# Patient Record
Sex: FEMALE | Race: WHITE | NOT HISPANIC OR LATINO | URBAN - METROPOLITAN AREA
[De-identification: names, ages, dates, MRNs, and addresses within clinical notes are randomized per-mention and may not be internally consistent; named-entity substitution may affect disease eponyms.]

---

## 2019-09-10 ENCOUNTER — AMBULATORY - HEALTHEAST (OUTPATIENT)
Dept: GERIATRICS | Facility: CLINIC | Age: 74
End: 2019-09-10

## 2019-09-11 ENCOUNTER — AMBULATORY - HEALTHEAST (OUTPATIENT)
Dept: ADMINISTRATIVE | Facility: CLINIC | Age: 74
End: 2019-09-11

## 2019-09-11 ENCOUNTER — OFFICE VISIT - HEALTHEAST (OUTPATIENT)
Dept: GERIATRICS | Facility: CLINIC | Age: 74
End: 2019-09-11

## 2019-09-11 DIAGNOSIS — R41.89 COGNITIVE IMPAIRMENT: ICD-10-CM

## 2019-09-11 DIAGNOSIS — Z87.898 HISTORY OF SEIZURES: ICD-10-CM

## 2019-09-11 DIAGNOSIS — Z86.711 PERSONAL HISTORY OF PULMONARY EMBOLISM: ICD-10-CM

## 2019-09-11 DIAGNOSIS — Z90.49 STATUS POST CHOLECYSTECTOMY: ICD-10-CM

## 2019-09-11 DIAGNOSIS — D64.9 ANEMIA, UNSPECIFIED TYPE: ICD-10-CM

## 2019-09-11 RX ORDER — DOCUSATE SODIUM 100 MG/1
100 CAPSULE, LIQUID FILLED ORAL 2 TIMES DAILY PRN
Status: SHIPPED | COMMUNITY
Start: 2019-09-11

## 2019-09-11 RX ORDER — LEVETIRACETAM 500 MG/1
500 TABLET ORAL 3 TIMES DAILY
Status: SHIPPED | COMMUNITY
Start: 2019-09-11

## 2019-09-11 RX ORDER — LOSARTAN POTASSIUM 100 MG/1
50 TABLET ORAL DAILY
Status: SHIPPED | COMMUNITY
Start: 2019-09-11

## 2019-09-11 RX ORDER — ACETAMINOPHEN 500 MG
500 TABLET ORAL EVERY 4 HOURS PRN
Status: SHIPPED | COMMUNITY
Start: 2019-09-11

## 2019-09-11 RX ORDER — METOPROLOL SUCCINATE 50 MG/1
50 TABLET, EXTENDED RELEASE ORAL DAILY
Status: SHIPPED | COMMUNITY
Start: 2019-09-11

## 2019-09-11 RX ORDER — VENLAFAXINE HYDROCHLORIDE 150 MG/1
150 CAPSULE, EXTENDED RELEASE ORAL DAILY
Status: SHIPPED | COMMUNITY
Start: 2019-09-11

## 2019-09-12 ENCOUNTER — RECORDS - HEALTHEAST (OUTPATIENT)
Dept: LAB | Facility: CLINIC | Age: 74
End: 2019-09-12

## 2019-09-13 LAB
ANION GAP SERPL CALCULATED.3IONS-SCNC: 6 MMOL/L (ref 5–18)
BUN SERPL-MCNC: 8 MG/DL (ref 8–28)
CALCIUM SERPL-MCNC: 7.7 MG/DL (ref 8.5–10.5)
CHLORIDE BLD-SCNC: 103 MMOL/L (ref 98–107)
CO2 SERPL-SCNC: 25 MMOL/L (ref 22–31)
CREAT SERPL-MCNC: 0.52 MG/DL (ref 0.6–1.1)
ERYTHROCYTE [DISTWIDTH] IN BLOOD BY AUTOMATED COUNT: 16.6 % (ref 11–14.5)
GFR SERPL CREATININE-BSD FRML MDRD: >60 ML/MIN/1.73M2
GLUCOSE BLD-MCNC: 65 MG/DL (ref 70–125)
HCT VFR BLD AUTO: 29.7 % (ref 35–47)
HGB BLD-MCNC: 9.6 G/DL (ref 12–16)
MAGNESIUM SERPL-MCNC: 1.4 MG/DL (ref 1.8–2.6)
MCH RBC QN AUTO: 29 PG (ref 27–34)
MCHC RBC AUTO-ENTMCNC: 32.3 G/DL (ref 32–36)
MCV RBC AUTO: 90 FL (ref 80–100)
PLATELET # BLD AUTO: 363 THOU/UL (ref 140–440)
PMV BLD AUTO: 9.8 FL (ref 8.5–12.5)
POTASSIUM BLD-SCNC: 3.4 MMOL/L (ref 3.5–5)
PREALB SERPL-MCNC: 10.1 MG/DL (ref 19–38)
RBC # BLD AUTO: 3.31 MILL/UL (ref 3.8–5.4)
SODIUM SERPL-SCNC: 134 MMOL/L (ref 136–145)
WBC: 6.8 THOU/UL (ref 4–11)

## 2019-09-16 ENCOUNTER — OFFICE VISIT - HEALTHEAST (OUTPATIENT)
Dept: GERIATRICS | Facility: CLINIC | Age: 74
End: 2019-09-16

## 2019-09-16 DIAGNOSIS — E53.8 VITAMIN B12 DEFICIENCY: ICD-10-CM

## 2019-09-16 DIAGNOSIS — I10 ESSENTIAL HYPERTENSION: ICD-10-CM

## 2019-09-16 DIAGNOSIS — E61.2 MAGNESIUM DEFICIENCY: ICD-10-CM

## 2019-09-16 DIAGNOSIS — E87.6 HYPOKALEMIA: ICD-10-CM

## 2019-09-16 DIAGNOSIS — Z29.89 SEIZURE PROPHYLAXIS: ICD-10-CM

## 2019-09-16 DIAGNOSIS — K80.01 CALCULUS OF GALLBLADDER WITH ACUTE CHOLECYSTITIS AND OBSTRUCTION: ICD-10-CM

## 2019-09-16 DIAGNOSIS — Z79.01 ANTICOAGULATION ADEQUATE: ICD-10-CM

## 2019-09-17 ENCOUNTER — RECORDS - HEALTHEAST (OUTPATIENT)
Dept: LAB | Facility: CLINIC | Age: 74
End: 2019-09-17

## 2019-09-17 LAB
25(OH)D3 SERPL-MCNC: 24.2 NG/ML (ref 30–80)
T4 FREE SERPL-MCNC: 0.8 NG/DL (ref 0.7–1.8)
TSH SERPL DL<=0.005 MIU/L-ACNC: 8.8 UIU/ML (ref 0.3–5)
VIT B12 SERPL-MCNC: >2000 PG/ML (ref 213–816)

## 2019-09-18 ENCOUNTER — OFFICE VISIT - HEALTHEAST (OUTPATIENT)
Dept: GERIATRICS | Facility: CLINIC | Age: 74
End: 2019-09-18

## 2019-09-18 DIAGNOSIS — Z87.898 HISTORY OF SEIZURES: ICD-10-CM

## 2019-09-18 DIAGNOSIS — R41.89 COGNITIVE IMPAIRMENT: ICD-10-CM

## 2019-09-18 DIAGNOSIS — Z86.711 PERSONAL HISTORY OF PULMONARY EMBOLISM: ICD-10-CM

## 2019-09-18 DIAGNOSIS — I10 ESSENTIAL HYPERTENSION: ICD-10-CM

## 2019-09-18 DIAGNOSIS — Z90.49 STATUS POST CHOLECYSTECTOMY: ICD-10-CM

## 2019-09-20 ENCOUNTER — RECORDS - HEALTHEAST (OUTPATIENT)
Dept: LAB | Facility: CLINIC | Age: 74
End: 2019-09-20

## 2019-09-20 LAB
ANION GAP SERPL CALCULATED.3IONS-SCNC: 7 MMOL/L (ref 5–18)
BUN SERPL-MCNC: 7 MG/DL (ref 8–28)
CALCIUM SERPL-MCNC: 8.3 MG/DL (ref 8.5–10.5)
CHLORIDE BLD-SCNC: 105 MMOL/L (ref 98–107)
CO2 SERPL-SCNC: 25 MMOL/L (ref 22–31)
CREAT SERPL-MCNC: 0.61 MG/DL (ref 0.6–1.1)
GFR SERPL CREATININE-BSD FRML MDRD: >60 ML/MIN/1.73M2
GLUCOSE BLD-MCNC: 52 MG/DL (ref 70–125)
MAGNESIUM SERPL-MCNC: 1.7 MG/DL (ref 1.8–2.6)
POTASSIUM BLD-SCNC: 4 MMOL/L (ref 3.5–5)
SODIUM SERPL-SCNC: 137 MMOL/L (ref 136–145)

## 2019-09-23 ENCOUNTER — OFFICE VISIT - HEALTHEAST (OUTPATIENT)
Dept: GERIATRICS | Facility: CLINIC | Age: 74
End: 2019-09-23

## 2019-09-23 DIAGNOSIS — Z29.89 SEIZURE PROPHYLAXIS: ICD-10-CM

## 2019-09-23 DIAGNOSIS — E87.6 HYPOKALEMIA: ICD-10-CM

## 2019-09-23 DIAGNOSIS — E53.8 VITAMIN B12 DEFICIENCY: ICD-10-CM

## 2019-09-23 DIAGNOSIS — I10 ESSENTIAL HYPERTENSION: ICD-10-CM

## 2019-09-23 DIAGNOSIS — K80.01 CALCULUS OF GALLBLADDER WITH ACUTE CHOLECYSTITIS AND OBSTRUCTION: ICD-10-CM

## 2019-09-23 DIAGNOSIS — Z79.01 ANTICOAGULATION ADEQUATE: ICD-10-CM

## 2019-09-23 DIAGNOSIS — E61.2 MAGNESIUM DEFICIENCY: ICD-10-CM

## 2019-09-23 RX ORDER — MAGNESIUM OXIDE 400 MG/1
400 TABLET ORAL DAILY
Status: SHIPPED | COMMUNITY
Start: 2019-09-23

## 2019-09-25 ENCOUNTER — OFFICE VISIT - HEALTHEAST (OUTPATIENT)
Dept: GERIATRICS | Facility: CLINIC | Age: 74
End: 2019-09-25

## 2019-09-25 DIAGNOSIS — Z79.01 ANTICOAGULATION ADEQUATE: ICD-10-CM

## 2019-09-25 DIAGNOSIS — I10 ESSENTIAL HYPERTENSION: ICD-10-CM

## 2019-09-25 DIAGNOSIS — E87.6 HYPOKALEMIA: ICD-10-CM

## 2019-09-25 DIAGNOSIS — K80.01 CALCULUS OF GALLBLADDER WITH ACUTE CHOLECYSTITIS AND OBSTRUCTION: ICD-10-CM

## 2019-09-25 DIAGNOSIS — E53.8 VITAMIN B12 DEFICIENCY: ICD-10-CM

## 2019-09-25 DIAGNOSIS — Z29.89 SEIZURE PROPHYLAXIS: ICD-10-CM

## 2019-09-25 DIAGNOSIS — E61.2 MAGNESIUM DEFICIENCY: ICD-10-CM

## 2019-09-30 ENCOUNTER — AMBULATORY - HEALTHEAST (OUTPATIENT)
Dept: GERIATRICS | Facility: CLINIC | Age: 74
End: 2019-09-30

## 2020-05-07 ENCOUNTER — VIRTUAL VISIT (OUTPATIENT)
Dept: FAMILY MEDICINE | Facility: OTHER | Age: 75
End: 2020-05-07

## 2020-05-07 ENCOUNTER — NURSE TRIAGE (OUTPATIENT)
Dept: NURSING | Facility: CLINIC | Age: 75
End: 2020-05-07

## 2020-05-07 ENCOUNTER — OFFICE VISIT - HEALTHEAST (OUTPATIENT)
Dept: FAMILY MEDICINE | Facility: CLINIC | Age: 75
End: 2020-05-07

## 2020-05-07 DIAGNOSIS — Z20.822 SUSPECTED 2019 NOVEL CORONAVIRUS INFECTION: ICD-10-CM

## 2020-05-07 NOTE — TELEPHONE ENCOUNTER
Ne's  currently in [facility].   at [place] [facility] is requesting Ne be tested for Covid.  Ne asymptomatic, last exposure to  was on Sunday (5/3/2020).  In preparation for hospital discharge,  wanting Ne to be protected.  Ne not meeting criteria for reginaldo testing.  Did review process for PCR testing which is [email or web address].  Questions answered and information provided.      Reason for Disposition    COVID-19 Testing, questions about    Protocols used: CORONAVIRUS (COVID-19) EXPOSURE-A- 4.22.20

## 2020-05-08 NOTE — PROGRESS NOTES
"Date: 2020 14:17:40  Clinician: Saniya Acuna  Clinician NPI: [id number]  Patient: Lindsey Ayoub  Patient : 1945  Patient Address: [address] [place], [place] [address]  Patient Phone: [phone]  Visit Protocol: URI  Patient Summary:  Lindsey is a 75 year old ( : 1945 ) female who initiated a Visit for COVID-19 (Coronavirus) evaluation and screening. When asked the question \"Please sign me up to receive news, health information and promotions. \", Lindsey responded \"No\".    Lindsey states her symptoms started gradually 7-9 days ago. After her symptoms started, they improved and then got worse again.   Her symptoms consist of a sore throat, a cough, diarrhea, a headache, and malaise.   Symptom details     Cough: Lindsey coughs a few times an hour and her cough is not more bothersome at night. Phlegm does not come into her throat when she coughs. She does not believe her cough is caused by post-nasal drip.     Sore throat: Lindsey reports having mild throat pain (1-3 on a 10 point pain scale), does not have exudate on her tonsils, and can swallow liquids. She is not sure if the lymph nodes in her neck are enlarged. A rash has not appeared on the skin since the sore throat started.     Headache: She states the headache is mild (1-3 on a 10 point pain scale).      Lindsey denies having fever, myalgias, rhinitis, facial pain or pressure, nasal congestion, vomiting, nausea, teeth pain, ageusia, anosmia, ear pain, wheezing, and chills. She also denies taking antibiotic medication for the symptoms, having a sinus infection within the past year, and having recent facial or sinus surgery in the past 60 days. She is not experiencing dyspnea.   Precipitating events  Within the past week, Lindsey has not been exposed to someone with strep throat. She has not recently been exposed to someone with influenza. Lindsey has been in close contact with the following high risk individuals: adults 65 or older.   Pertinent COVID-19 " (Coronavirus) information  Lindsey does not work or volunteer as healthcare worker or a  and does not work or volunteer in a healthcare facility.   She does not live with a healthcare worker.   Lindsey has had a close contact with a laboratory-confirmed COVID-19 patient within 14 days of symptom onset. She also has had a close contact with a suspected COVID-19 patient within 14 days of symptom onset. Additional information about contact with COVID-19 (Coronavirus) patient as reported by the patient (free text):  tested positive on May 3 he wasn't feeling well a week before testing positive.. I have felt ill during same time period   Pertinent medical history  Lindsey does not get yeast infections when she takes antibiotics.   Lindsey does not need a return to work/school note.   Weight: 140 lbs   Lindsey does not smoke or use smokeless tobacco.   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 This illness can cause fever, cough and trouble breathing. Many people get a mild case and get better on their own. Some people can get very sick.   Will I be tested for COVID-19?  We would recommend you be tested for coronavirus. Here is how to get that scheduled:   Call [phone]. Tell them you were referred by [facility] to have a COVID-19 test. You will be scheduled at one of our [facility] testing locations (drive-up). Please have your [facility] visit information ready when you call including your visit ID number to verify you were referred.    How can I protect others in the meantime?  First, stay home and away from others (self-isolate) until:   You've  had no fever---and no medicine that reduces fever---for 3 full days (72 hours). And...    Your other symptoms have gotten better. For example, your cough or breathing has improved. And...    At least 10 days have passed since your symptoms started.   During this time:   Don't go to work, school or anywhere else.     Stay away from others in your home. No hugging, kissing or shaking hands.    Don't let anyone visit.    Cover your mouth and nose with a mask, tissue or wash cloth to avoid spreading germs.    Wash your hands and face often. Use soap and water.   How can I take care of myself?  1.Take Tylenol (acetaminophen) for fever or pain. If you have liver or kidney problems, ask your family doctor if it's okay to take Tylenol.   Adults can take either:    650 mg (two 325 mg pills) every 4 to 6 hours, or...    1,000 mg (two 500 mg pills) every 8 hours as needed.     Note: Don't take more than 3,000 mg in one day.   For children, check the Tylenol bottle for the right dose. The dose is based on the child's age or weight.  2.If you have other health problems (like cancer, heart failure, an organ transplant or severe kidney disease): Call your specialty clinic if you don't feel better in the next 2 days.  3.Know when to call 911: If your breathing is so bad that it keeps you from doing normal activities, call 911 or go to the emergency room. Tell them that you've been staying home and may have COVID-19.  4.Sign up for [employer]. We know it's scary to hear that you might have COVID-19. We want to track your symptoms to make sure you're okay over the next 2 weeks. Please look for an email from [employer]---this is a free, online program that we'll use to keep in touch. To sign up, follow the link in the email. Learn more at [email or web address].  Where can I get more information?  To learn more about COVID-19 and how to care for yourself at home, please visit the CDC website at  https://www.cdc.gov/coronavirus/2019-ncov/about/steps-when-sick.html.  For more about your care at [facility], please visit [email or web address]     Diagnosis: Cough  Diagnosis ICD: R05

## 2020-05-09 ENCOUNTER — COMMUNICATION - HEALTHEAST (OUTPATIENT)
Dept: FAMILY MEDICINE | Facility: CLINIC | Age: 75
End: 2020-05-09

## 2021-06-01 NOTE — PROGRESS NOTES
[facility]      Facility:    [facility] [[id number]]  Code Status: POLST AVAILABLE      Chief Complaint/Reason for Visit:   Chief Complaint   Patient presents with     Review Of Multiple Medical Conditions       HPI:   Ne is a 74 y.o. female who is a recent transfer from [facility] with admission on 8/28/2019 and discharged on 9/10/2019.  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  She was previously hospitalized at [facility] from 2/26/2019 to 3/1/2019 with extensive right lower extremity DVT involving IVC, right iliac femoral and distal veins.  She was then discharged on apixaban.  On 3/11/2019 underwent venogram thrombolytic and thrombectomy followed by venoplasty and IVC filter retrieval.  At that time she was diagnosed with a PE was choledocholithiasis causing bile duct dilation of 1.2cm.  GI attempted ERCP on 2/27/1990 with stent placement but was unsuccessful as the ampulla could not be cannulated.  It was then recommended that she have a laparoscopic assisted ERCP with lap amanda when safe to do so.  She did see Dr. Wick in clinic on 8/7/2019 plan was to hold on surgery as she was asymptomatic.  The night prior to admission developed post prandial right upper quadrant abdominal pain associate with fever and chills which prompted her ED presentation.  She was afebrile and had normal vitals, leukocytosis with a white count of 15 point 8T bili of 2.5D bili 1.9 ALP 1154, imaging showed cholecystitis with hypertrophic gallbladder intra-and extrahepatic biliary dilatation as well as numerous common bile duct stones.  Was started on Zosyn and surgery was consulted.  Taken to the OR on 8/11/2019 for combined ERCP and cholecystectomy.  Laparoscopic  removal was attempted though converted to open procedure due to failing of insufflation due to high intra-abdominal pressure and maximum dose of paralytics.  Gallbladder was distended though not particularly inflamed.  There was diffuse oozing from the gallbladder fossa, which was felt to be related to apixaban so a drain was placed.  Despite neuromuscular blockade reversal she was intubated overnight postoperatively and required Adan-Synephrine with extubation the following day.  Also given 1 unit packed red blood cell for hemoglobin of 6.9 and FFP vitamin K for INR reversal with an INR of 2.6 on 9/1/2019.  She did have her drain/Posada removed prior to discharge.  She was discharged to TCU for rehab.    Past Medical History:  Past Medical History:   Diagnosis Date     Acute deep vein thrombosis (DVT) of femoral vein of right lower extremity (H)      Anemia      Benign essential HTN      Bilateral pulmonary embolism (H)      BPV (benign positional vertigo), bilateral      Breast cancer (H)      Cataracts, bilateral      Choledocholithiasis with cholecystitis      Depression      Epilepsy, unspecified, intractable, without status epilepticus (H)      Estrogen receptor negative      Fx wrist      Gastric bypass status for obesity      GIST (gastrointestinal stromal tumor), non-malignant      HTN (hypertension)      Hyponatremia      Malignant neoplasm of breast (female) (H)      Obesity      Osteopenia      S/p bilateral carpal tunnel release      Seizures (H)      Systolic murmur      Unspecified disorder of ear, unspecified ear            Surgical History:  Past Surgical History:   Procedure Laterality Date     APPENDECTOMY       BREAST LUMPECTOMY  2009     CARPAL TUNNEL RELEASE Left 01/29/2015     CHOLECYSTECTOMY OPEN  08/30/2019     ERCP  02/27/2019     ERCP  08/30/2019     GASTRIC BYPASS  2003     HEMIARTHROPLASTY HIP Right 12/03/2010     HYSTERECTOMY       INNER EAR SURGERY       IR IVC FILTER PLACEMENT Right  2019     REVISION TOTAL HIP ARTHROPLASTY Right 2011     TOTAL HIP ARTHROPLASTY Left 2011       Family History:   Family History   Problem Relation Age of Onset     Diabetes Mother      Hypertension Mother      Kidney cancer Mother      No Medical Problems Father      COPD Sister      Breast cancer Maternal Grandmother      No Medical Problems Paternal Grandmother      No Medical Problems Paternal Grandfather      Cancer Sister        Social History:    Social History     Socioeconomic History     Marital status:      Spouse name: Not on file     Number of children: Not on file     Years of education: Not on file     Highest education level: Not on file   Occupational History     Not on file   Social Needs     Financial resource strain: Not on file     Food insecurity:     Worry: Not on file     Inability: Not on file     Transportation needs:     Medical: Not on file     Non-medical: Not on file   Tobacco Use     Smoking status: Former Smoker     Last attempt to quit: 1965     Years since quittin.7     Smokeless tobacco: Never Used   Substance and Sexual Activity     Alcohol use: Not Currently     Alcohol/week: 2.4 oz     Types: 4 Glasses of wine per week     Drug use: Not on file     Sexual activity: Not on file   Lifestyle     Physical activity:     Days per week: Not on file     Minutes per session: Not on file     Stress: Not on file   Relationships     Social connections:     Talks on phone: Not on file     Gets together: Not on file     Attends Yazdanism service: Not on file     Active member of club or organization: Not on file     Attends meetings of clubs or organizations: Not on file     Relationship status: Not on file     Intimate partner violence:     Fear of current or ex partner: Not on file     Emotionally abused: Not on file     Physically abused: Not on file     Forced sexual activity: Not on file   Other Topics Concern     Not on file   Social History Narrative      Not on file          Review of Systems   Constitutional: Positive for activity change and fatigue. Negative for appetite change, chills, diaphoresis and fever.        Denies concerns   HENT: Negative for congestion and hearing loss.    Eyes: Negative.    Respiratory: Negative for shortness of breath and wheezing.    Cardiovascular: Positive for leg swelling.   Gastrointestinal: Negative for abdominal distention, abdominal pain, constipation, diarrhea and nausea.   Endocrine: Negative.    Genitourinary: Negative for difficulty urinating.   Musculoskeletal:        Denies pain   Skin: Positive for wound.        Abdominal incision   Allergic/Immunologic: Negative.    Neurological: Negative for dizziness, tremors, speech difficulty and light-headedness.   Hematological: Negative.    Psychiatric/Behavioral: Negative for agitation, confusion, hallucinations and sleep disturbance. The patient is not nervous/anxious.        Vitals:    09/16/19 1134   BP: 123/87   Pulse: 80   Resp: 16   Temp: 98  F (36.7  C)   SpO2: 99%   Weight: 128 lb (58.1 kg)       Physical Exam   Constitutional: No distress.   No acute issues   HENT:   Head: Normocephalic and atraumatic.   Mouth/Throat: Oropharynx is clear and moist. No oropharyngeal exudate.   Eyes: Right eye exhibits no discharge. Left eye exhibits no discharge. No scleral icterus.   Neck: Normal range of motion. Neck supple. No JVD present.   Cardiovascular: Normal rate. Exam reveals no gallop and no friction rub.   No murmur heard.  Pulmonary/Chest: Effort normal. No stridor. No respiratory distress. She has no wheezes. She has no rales.   Dim, RA   Abdominal: Soft. Bowel sounds are normal. She exhibits no distension. There is no tenderness. There is no rebound.   Denies constipation or diarrhea   Genitourinary:   Genitourinary Comments: deferred   Musculoskeletal: She exhibits edema.   Lymphedema present, using wraps   Neurological: She is alert.   A/O x2, recall issues   Skin: Skin  is warm and dry. She is not diaphoretic. No erythema.   Abdominal incision, staples present   Psychiatric: She has a normal mood and affect.   Denies anxiety or depression   Vitals reviewed.      Medication List:  Current Outpatient Medications   Medication Sig     magnesium chloride (SLOW-MAG) 64 mg TbEC delayed-release tablet Take 64 mg by mouth 3 (three) times a day.     acetaminophen (TYLENOL) 500 MG tablet Take 500 mg by mouth every 4 (four) hours as needed for pain.     apixaban (ELIQUIS) 5 mg Tab tablet Take 5 mg by mouth 2 (two) times a day.     cyanocobalamin, vitamin B-12, 2,000 mcg Tab Take 1 tablet by mouth daily.     docusate sodium (COLACE) 100 MG capsule Take 100 mg by mouth 2 (two) times a day as needed for constipation.     levETIRAcetam (KEPPRA) 500 MG tablet Take 500 mg by mouth 3 (three) times a day.            losartan (COZAAR) 100 MG tablet Take 100 mg by mouth daily.     metoprolol succinate (TOPROL-XL) 50 MG 24 hr tablet Take 50 mg by mouth daily.            potassium chloride (KLOR-CON) 10 MEQ CR tablet Take 10 mEq by mouth 3 (three) times a day.            venlafaxine (EFFEXOR-XR) 150 MG 24 hr capsule Take 150 mg by mouth daily.       Labs:  Results for orders placed or performed in visit on 09/13/19   Basic Metabolic Panel   Result Value Ref Range    Sodium 134 (L) 136 - 145 mmol/L    Potassium 3.4 (L) 3.5 - 5.0 mmol/L    Chloride 103 98 - 107 mmol/L    CO2 25 22 - 31 mmol/L    Anion Gap, Calculation 6 5 - 18 mmol/L    Glucose 65 (L) 70 - 125 mg/dL    Calcium 7.7 (L) 8.5 - 10.5 mg/dL    BUN 8 8 - 28 mg/dL    Creatinine 0.52 (L) 0.60 - 1.10 mg/dL    GFR MDRD Af Amer >60 >60 mL/min/1.73m2    GFR MDRD Non Af Amer >60 >60 mL/min/1.73m2     Lab Results   Component Value Date    WBC 6.8 09/13/2019    HGB 9.6 (L) 09/13/2019    HCT 29.7 (L) 09/13/2019    MCV 90 09/13/2019     09/13/2019     No results found for: TSH    No results found for: [id number]    No results found for:  [id number]      Assessment/Plan:    ERCP and cholecystectomy per open procedure: continue incisional cares, fu with surgeon on 9/16/19    Poor nutrition: Prealbumin 10.1, dietitian consulted    Pain control: Continue Tylenol 500 mg every 4 hours as needed, uses periodically    DVT prophylaxis: Continue apixaban 5 mg twice daily    Vitamin B12 deficiency: Continue cyanocobalamin 2000mcg daily    Hypo-kalemia: Increased potassium to 10 mEq 3 times daily, last K 3.4, recheck in 1 week    Hypomagnesia: Increase to Slow-Mag 64 mg 3 times daily, last 1.4, recheck in 1 week    Seizure prophylaxis: Continue Keppra 500 mg 3 times daily    Hypertension: Continue losartan 100 mg daily and decrease metoprolol tartrate to 50 mg daily, monitor blood pressure twice daily x1 week    Constipation: Continue docusate 100 mg twice daily as needed    Depression: Continue venlafaxine 100 mg daily    Disposition: Plans to return to previous residence.  Pending cognitive assessment    The care plan has been reviewed and all orders signed. Changes to care plan, if any, as noted. Otherwise, continue care plan of care.  The total time spent with this patient was 35 minutes, with greater than 50% spent in counseling and coordination of care that included multiple issues per surgeon follow-up, pain control, therapy, blood pressure monitoring and electrolyte replacement which lasted 18 minutes.    Post Discharge Medication Reconciliation Status: discharge medications reconciled, continue medications without change      Electronically signed by: Octavio Vanegas NP

## 2021-06-01 NOTE — PROGRESS NOTES
[facility]      Facility:    [facility] [[id number]]  Code Status: POLST AVAILABLE      Chief Complaint/Reason for Visit:   Chief Complaint   Patient presents with     Review Of Multiple Medical Conditions       HPI:   Ne is a 74 y.o. female who is a recent transfer from [facility] with admission on 8/28/2019 and discharged on 9/10/2019.  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  She was previously hospitalized at [facility] from 2/26/2019 to 3/1/2019 with extensive right lower extremity DVT involving IVC, right iliac femoral and distal veins.  She was then discharged on apixaban.  On 3/11/2019 underwent venogram thrombolytic and thrombectomy followed by venoplasty and IVC filter retrieval.  At that time she was diagnosed with a PE was choledocholithiasis causing bile duct dilation of 1.2cm.  GI attempted ERCP on 2/27/1990 with stent placement but was unsuccessful as the ampulla could not be cannulated.  It was then recommended that she have a laparoscopic assisted ERCP with lap amanda when safe to do so.  She did see Dr. Wick in clinic on 8/7/2019 plan was to hold on surgery as she was asymptomatic.  The night prior to admission developed post prandial right upper quadrant abdominal pain associate with fever and chills which prompted her ED presentation.  She was afebrile and had normal vitals, leukocytosis with a white count of 15 point 8T bili of 2.5D bili 1.9 ALP 1154, imaging showed cholecystitis with hypertrophic gallbladder intra-and extrahepatic biliary dilatation as well as numerous common bile duct stones.  Was started on Zosyn and surgery was consulted.  Taken to the OR on 8/11/2019 for combined ERCP and cholecystectomy.  Laparoscopic  removal was attempted though converted to open procedure due to failing of insufflation due to high intra-abdominal pressure and maximum dose of paralytics.  Gallbladder was distended though not particularly inflamed.  There was diffuse oozing from the gallbladder fossa, which was felt to be related to apixaban so a drain was placed.  Despite neuromuscular blockade reversal she was intubated overnight postoperatively and required Adan-Synephrine with extubation the following day.  Also given 1 unit packed red blood cell for hemoglobin of 6.9 and FFP vitamin K for INR reversal with an INR of 2.6 on 9/1/2019.  She did have her drain/Posada removed prior to discharge.  She was discharged to TCU for rehab.    Past Medical History:  Past Medical History:   Diagnosis Date     Acute deep vein thrombosis (DVT) of femoral vein of right lower extremity (H)      Anemia      Benign essential HTN      Bilateral pulmonary embolism (H)      BPV (benign positional vertigo), bilateral      Breast cancer (H)      Cataracts, bilateral      Choledocholithiasis with cholecystitis      Depression      Epilepsy, unspecified, intractable, without status epilepticus (H)      Estrogen receptor negative      Fx wrist      Gastric bypass status for obesity      GIST (gastrointestinal stromal tumor), non-malignant      HTN (hypertension)      Hyponatremia      Malignant neoplasm of breast (female) (H)      Obesity      Osteopenia      S/p bilateral carpal tunnel release      Seizures (H)      Systolic murmur      Unspecified disorder of ear, unspecified ear            Surgical History:  Past Surgical History:   Procedure Laterality Date     APPENDECTOMY       BREAST LUMPECTOMY  2009     CARPAL TUNNEL RELEASE Left 01/29/2015     CHOLECYSTECTOMY OPEN  08/30/2019     ERCP  02/27/2019     ERCP  08/30/2019     GASTRIC BYPASS  2003     HEMIARTHROPLASTY HIP Right 12/03/2010     HYSTERECTOMY       INNER EAR SURGERY       IR IVC FILTER PLACEMENT Right  2019     REVISION TOTAL HIP ARTHROPLASTY Right 2011     TOTAL HIP ARTHROPLASTY Left 2011       Family History:   Family History   Problem Relation Age of Onset     Diabetes Mother      Hypertension Mother      Kidney cancer Mother      No Medical Problems Father      COPD Sister      Breast cancer Maternal Grandmother      No Medical Problems Paternal Grandmother      No Medical Problems Paternal Grandfather      Cancer Sister        Social History:    Social History     Socioeconomic History     Marital status:      Spouse name: Not on file     Number of children: Not on file     Years of education: Not on file     Highest education level: Not on file   Occupational History     Not on file   Social Needs     Financial resource strain: Not on file     Food insecurity:     Worry: Not on file     Inability: Not on file     Transportation needs:     Medical: Not on file     Non-medical: Not on file   Tobacco Use     Smoking status: Former Smoker     Last attempt to quit: 1965     Years since quittin.7     Smokeless tobacco: Never Used   Substance and Sexual Activity     Alcohol use: Not Currently     Alcohol/week: 4.0 standard drinks     Types: 4 Glasses of wine per week     Drug use: Not on file     Sexual activity: Not on file   Lifestyle     Physical activity:     Days per week: Not on file     Minutes per session: Not on file     Stress: Not on file   Relationships     Social connections:     Talks on phone: Not on file     Gets together: Not on file     Attends Oriental orthodox service: Not on file     Active member of club or organization: Not on file     Attends meetings of clubs or organizations: Not on file     Relationship status: Not on file     Intimate partner violence:     Fear of current or ex partner: Not on file     Emotionally abused: Not on file     Physically abused: Not on file     Forced sexual activity: Not on file   Other Topics Concern     Not on file   Social History  Narrative     Not on file          Review of Systems   Constitutional: Positive for activity change and fatigue. Negative for appetite change, chills, diaphoresis and fever.        Denies concerns   HENT: Negative for congestion and hearing loss.    Eyes: Negative.    Respiratory: Negative for shortness of breath and wheezing.    Cardiovascular: Positive for leg swelling.        Lymphedema   Gastrointestinal: Negative for abdominal distention, abdominal pain, constipation, diarrhea and nausea.   Endocrine: Negative.    Genitourinary: Negative for difficulty urinating.   Musculoskeletal:        Denies pain   Skin: Positive for wound.        Abdominal incision   Allergic/Immunologic: Negative.    Neurological: Negative for dizziness, tremors, speech difficulty and light-headedness.   Hematological: Negative.    Psychiatric/Behavioral: Negative for agitation, confusion, hallucinations and sleep disturbance. The patient is not nervous/anxious.        Vitals:    09/23/19 0759   BP: (!) 140/92   Pulse: 94   Resp: 16   Temp: 97  F (36.1  C)   SpO2: 93%   Weight: 128 lb (58.1 kg)       Physical Exam   Constitutional: No distress.   No acute issues   HENT:   Head: Normocephalic and atraumatic.   Mouth/Throat: Oropharynx is clear and moist. No oropharyngeal exudate.   Eyes: Right eye exhibits no discharge. Left eye exhibits no discharge. No scleral icterus.   Neck: Normal range of motion. Neck supple. No JVD present.   Cardiovascular: Normal rate. Exam reveals no gallop and no friction rub.   No murmur heard.  Pulmonary/Chest: Effort normal. No stridor. No respiratory distress. She has no wheezes. She has no rales.   Dim, RA   Abdominal: Soft. Bowel sounds are normal. She exhibits no distension. There is no tenderness. There is no rebound.   Denies constipation or diarrhea, small fluid pocket in LUQ, stable   Genitourinary:    Genitourinary Comments: deferred   Musculoskeletal:         General: Edema present.      Comments:  Lymphedema present, using wraps on RLE     Neurological: She is alert.   A/O x2, recall issues   Skin: Skin is warm and dry. She is not diaphoretic. No erythema.   Abdominal incision, staples and sutures removed, steri strips intact   Psychiatric: She has a normal mood and affect.   Denies anxiety or depression   Vitals reviewed.      Medication List:  Current Outpatient Medications   Medication Sig     cholecalciferol, vitamin D3, 1,000 unit (25 mcg) tablet Take 2,000 Units by mouth daily.     magnesium oxide (MAG-OX) 400 mg (241.3 mg magnesium) tablet Take 400 mg by mouth daily.     acetaminophen (TYLENOL) 500 MG tablet Take 500 mg by mouth every 4 (four) hours as needed for pain.     apixaban (ELIQUIS) 5 mg Tab tablet Take 5 mg by mouth 2 (two) times a day.     cyanocobalamin, vitamin B-12, 2,000 mcg Tab Take 1 tablet by mouth daily.     docusate sodium (COLACE) 100 MG capsule Take 100 mg by mouth 2 (two) times a day as needed for constipation.     levETIRAcetam (KEPPRA) 500 MG tablet Take 500 mg by mouth 3 (three) times a day.            losartan (COZAAR) 100 MG tablet Take 50 mg by mouth daily. Hold for SBP <110 or DBP <60           magnesium chloride (SLOW-MAG) 64 mg TbEC delayed-release tablet Take 64 mg by mouth 3 (three) times a day.     metoprolol succinate (TOPROL-XL) 50 MG 24 hr tablet Take 50 mg by mouth daily.            potassium chloride (KLOR-CON) 10 MEQ CR tablet Take 10 mEq by mouth 3 (three) times a day.            venlafaxine (EFFEXOR-XR) 150 MG 24 hr capsule Take 150 mg by mouth daily.       Labs:  Results for orders placed or performed in visit on 09/20/19   Basic Metabolic Panel   Result Value Ref Range    Sodium 137 136 - 145 mmol/L    Potassium 4.0 3.5 - 5.0 mmol/L    Chloride 105 98 - 107 mmol/L    CO2 25 22 - 31 mmol/L    Anion Gap, Calculation 7 5 - 18 mmol/L    Glucose 52 (LL) 70 - 125 mg/dL    Calcium 8.3 (L) 8.5 - 10.5 mg/dL    BUN 7 (L) 8 - 28 mg/dL    Creatinine 0.61 0.60 - 1.10  mg/dL    GFR MDRD Af Amer >60 >60 mL/min/1.73m2    GFR MDRD Non Af Amer >60 >60 mL/min/1.73m2     Lab Results   Component Value Date    WBC 6.8 09/13/2019    HGB 9.6 (L) 09/13/2019    HCT 29.7 (L) 09/13/2019    MCV 90 09/13/2019     09/13/2019     Lab Results   Component Value Date    TSH 8.80 (H) 09/17/2019       Vitamin D, Total (25-Hydroxy)   Date Value Ref Range Status   09/17/2019 24.2 (L) 30.0 - 80.0 ng/mL Final       Lab Results   Component Value Date    ENQVYHQV89 >2,000 (H) 09/17/2019         Assessment/Plan:    ERCP and cholecystectomy per open procedure: continue incisional cares, staples and suture removed, Steri-Strips intact, ordered to keep intact until 9/30, no f/u ordered    Poor nutrition: Prealbumin 10.1, PO intake improved    Pain control: Continue Tylenol 500 mg every 4 hours as needed, uses periodically    DVT prophylaxis: Continue apixaban 5 mg twice daily    Vitamin B12 deficiency: cyanocobalamin discontinued    Vit D def: started on D3 2000U daily    Hypo-kalemia: Increased potassium to 10 mEq 3 times daily, last K 4.0 on 9/20/19    Hypomagnesia: Increased to Slow-Mag 64 mg 3 times daily with mag oxide 400mg daily, last 1.7 on 9/20/19. Recheck with PCP    Seizure prophylaxis: Continue Keppra 500 mg 3 times daily    Subclinical hypothyroidism: Last TSH 8.8 with free T4 0.8    Hypertension: decreased losartan 50 mg daily (hold for SBP <110 and DBP <60) and decreased metoprolol tartrate to 50 mg daily, SBP <140    Constipation: Continue docusate 100 mg twice daily as needed    Depression: Continue venlafaxine 100 mg daily    Disposition: Plans to return to previous residence.  [facility] 19/30 and CPT 4.6      Electronically signed by: Octavio Vanegas NP

## 2021-06-03 VITALS
WEIGHT: 128 LBS | HEART RATE: 94 BPM | TEMPERATURE: 97 F | RESPIRATION RATE: 16 BRPM | OXYGEN SATURATION: 93 % | SYSTOLIC BLOOD PRESSURE: 140 MMHG | DIASTOLIC BLOOD PRESSURE: 92 MMHG

## 2021-06-03 VITALS
WEIGHT: 128 LBS | HEART RATE: 80 BPM | TEMPERATURE: 98 F | DIASTOLIC BLOOD PRESSURE: 87 MMHG | SYSTOLIC BLOOD PRESSURE: 123 MMHG | OXYGEN SATURATION: 99 % | RESPIRATION RATE: 16 BRPM

## 2021-06-03 VITALS
DIASTOLIC BLOOD PRESSURE: 86 MMHG | WEIGHT: 128 LBS | TEMPERATURE: 98 F | OXYGEN SATURATION: 97 % | SYSTOLIC BLOOD PRESSURE: 125 MMHG | HEART RATE: 97 BPM | RESPIRATION RATE: 16 BRPM

## 2021-06-08 NOTE — TELEPHONE ENCOUNTER
Coronavirus (COVID-19) Notification    Patient  Ne Monzon    Reason for call  Notify of Positive Coronavirus (COVID-19) lab results, assess symptoms,  review [facility] recommendations    Lab Result    Lab test:  2019-nCoV rRt-PCR or SARS-CoV-2 PCR    Oropharyngeal AND/OR nasopharyngeal swabs is POSITIVE for 2019-nCoV RNA/SARS-COV-2 PCR (COVID-19 virus)    RN Recommendations/Instructions per [facility] Coronavirus COVID-19 recommendations    Brief introduction script  Hi, My name is Sissy and I am calling on behalf of [email or web address] [place] [employer].  We were notified that your Coronavirus test (COVID-19) for was POSITIVE for the virus.  I have some information to relay to you but first I wanted to mention that the [place] Dept of Health will be contacting you shortly [it's possible MD already called Patient] to talk to you more about how you are feeling and other people you have had contact with who might now also have the virus.  Also, [facility] is Partnering with the [facility] for Covid-19 research, you may be contacted directly by research staff.    Assessment (Inquire about Patient's current symptoms)  No symptoms.  Spouse was hospitalized for covid-19.    If at time of call, Patients symptoms hare worsened, the Patient should contact 911 or have someone drive them to Emergency Dept promptly:      If Patient calling 911, inform 911 personal that you have tested positive for the Coronavirus (COVID-19).  Place mask on and await 911 to arrive.    If Emergency Dept, If possible, please have another adult drive you to the Emergency Dept but you need to wear mask when in contact with other people.      Review information with Patient    Since you tested POSITIVE for the COVID-19 virus, it is important that you protect others from being exposed and infected with this virus.    [For safety, it's very important to follow these rules.]    First, stay home and away from others (self-isolate) until:    You've  had no fever--and no medicine that reduces fever--for 3 full days (72 hours). And      Your other symptoms have gotten better. For example, your cough or breathing has improved. And     At least 10 days have passed since your symptoms started.    During this time:    Stay in your own room (and use your own bathroom), if you can.    Stay away from others in your home. No hugging, kissing or shaking hands.    Don't let anyone visit.    Don't go to work, school or anywhere else.     Clean  high touch  surfaces often (doorknobs, counters, handles, etc.). Use a household cleaning spray or wipes.    Cover your mouth and nose with a mask, tissue or washcloth to avoid spreading germs.    Wash your hands and face often with soap and water.    You should not go back to work until you meet the guidelines above for ending your home isolation. You should meet these along with any other guidelines that your employer has.  Employers: This document serves as formal notice of your employee's medical guidelines for going back to work. They must meet the above guidelines before going back to work in person.    How can I take care of myself?  1. Get lots of rest. Drink extra fluids (unless a doctor has told you not to).    2. Take Tylenol (acetaminophen) for fever or pain. If you have liver or kidney problems, ask your family doctor if it's okay to take Tylenol.     Take either:     650 mg (two 325 mg pills) every 4 to 6 hours, or     1,000 mg (two 500 mg pills) every 8 hours as needed.     Note: Don't take more than 3,000 mg in one day. Acetaminophen is found in many medicines (both prescribed and over-the-counter medicines). Read all labels to be sure you don't take too much.  For children, check the Tylenol bottle for the right dose. The dose is based on the child's age or weight.  3. If you have other health problems (like cancer, heart failure, an organ transplant or severe kidney disease): Call your specialty clinic if you don't  feel better in the next 2 days.    4. Know when to call 911: If your breathing is so bad that it keeps you from doing normal activities, call 911 or go to the emergency room. Tell them that you've been staying home and may have COVID-19.  5. Sign up for [employer]. We know it's scary to hear that you have COVID-19. We want to track your symptoms to make sure you're okay over the next 2 weeks. Please look for an email from [employer]--this is a free, online program that we'll use to keep in touch. To sign up, follow the link in the email. Learn more at [email or web address].    Where can I get more information?    To learn the [place]'s guidelines for staying home, please visit the [facility] of Health website at [email or web address]/coronavirus/basics.html.    To learn more about COVID-19 and how to care for yourself at home, please visit the CDC website at https://www.cdc.gov/coronavirus/2019-ncov/about/steps-when-sick.html.    For more options for care at [facility], please visit our website at [email or web address]      [place] Dept of Health ([facility]) COVID-19 Hotline:   [phone]    Positive COVID-19 letter Sent (Yes/No):  Yes      Sissy Mayer RN  Covid-19 Results Team  [phone]

## 2021-06-16 PROBLEM — Z29.89 SEIZURE PROPHYLAXIS: Status: ACTIVE | Noted: 2019-09-16

## 2021-06-16 PROBLEM — E87.6 HYPOKALEMIA: Status: ACTIVE | Noted: 2019-09-16

## 2021-06-16 PROBLEM — K80.01 CALCULUS OF GALLBLADDER WITH ACUTE CHOLECYSTITIS AND OBSTRUCTION: Status: ACTIVE | Noted: 2019-09-16

## 2021-06-16 PROBLEM — Z79.01 ANTICOAGULATION ADEQUATE: Status: ACTIVE | Noted: 2019-09-16

## 2021-06-16 PROBLEM — I10 ESSENTIAL HYPERTENSION: Status: ACTIVE | Noted: 2019-09-16

## 2021-06-16 PROBLEM — E53.8 VITAMIN B12 DEFICIENCY: Status: ACTIVE | Noted: 2019-09-16

## 2021-06-16 PROBLEM — E61.2 MAGNESIUM DEFICIENCY: Status: ACTIVE | Noted: 2019-09-16

## 2021-06-19 NOTE — LETTER
Letter by Octavio Vanegas NP at      Author: Octavio Vanegas NP Service: -- Author Type: --    Filed:  Encounter Date: 9/23/2019 Status: Signed         Patient: Ne Monzon   MR Number: [id number]   YOB: 1945   Date of Visit: 9/23/2019     [facility]      Facility:    [facility] [[id number]]  Code Status: POLST AVAILABLE      Chief Complaint/Reason for Visit:   Chief Complaint   Patient presents with   ? Review Of Multiple Medical Conditions       HPI:   Ne is a 74 y.o. female who is a recent transfer from [facility] with admission on 8/28/2019 and discharged on 9/10/2019.  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  She was previously hospitalized at [facility] from 2/26/2019 to 3/1/2019 with extensive right lower extremity DVT involving IVC, right iliac femoral and distal veins.  She was then discharged on apixaban.  On 3/11/2019 underwent venogram thrombolytic and thrombectomy followed by venoplasty and IVC filter retrieval.  At that time she was diagnosed with a PE was choledocholithiasis causing bile duct dilation of 1.2cm.  GI attempted ERCP on 2/27/1990 with stent placement but was unsuccessful as the ampulla could not be cannulated.  It was then recommended that she have a laparoscopic assisted ERCP with lap amanda when safe to do so.  She did see Dr. Wick in clinic on 8/7/2019 plan was to hold on surgery as she was asymptomatic.  The night prior to admission developed post prandial right upper quadrant abdominal pain associate with fever and chills which prompted her ED presentation.  She was afebrile and had normal vitals, leukocytosis with a white count of 15 point 8T bili of 2.5D bili 1.9 ALP 1154, imaging showed  cholecystitis with hypertrophic gallbladder intra-and extrahepatic biliary dilatation as well as numerous common bile duct stones.  Was started on Zosyn and surgery was consulted.  Taken to the OR on 8/11/2019 for combined ERCP and cholecystectomy.  Laparoscopic removal was attempted though converted to open procedure due to failing of insufflation due to high intra-abdominal pressure and maximum dose of paralytics.  Gallbladder was distended though not particularly inflamed.  There was diffuse oozing from the gallbladder fossa, which was felt to be related to apixaban so a drain was placed.  Despite neuromuscular blockade reversal she was intubated overnight postoperatively and required Adan-Synephrine with extubation the following day.  Also given 1 unit packed red blood cell for hemoglobin of 6.9 and FFP vitamin K for INR reversal with an INR of 2.6 on 9/1/2019.  She did have her drain/Posada removed prior to discharge.  She was discharged to TCU for rehab.    Past Medical History:  Past Medical History:   Diagnosis Date   ? Acute deep vein thrombosis (DVT) of femoral vein of right lower extremity (H)    ? Anemia    ? Benign essential HTN    ? Bilateral pulmonary embolism (H)    ? BPV (benign positional vertigo), bilateral    ? Breast cancer (H)    ? Cataracts, bilateral    ? Choledocholithiasis with cholecystitis    ? Depression    ? Epilepsy, unspecified, intractable, without status epilepticus (H)    ? Estrogen receptor negative    ? Fx wrist    ? Gastric bypass status for obesity    ? GIST (gastrointestinal stromal tumor), non-malignant    ? HTN (hypertension)    ? Hyponatremia    ? Malignant neoplasm of breast (female) (H)    ? Obesity    ? Osteopenia    ? S/p bilateral carpal tunnel release    ? Seizures (H)    ? Systolic murmur    ? Unspecified disorder of ear, unspecified ear            Surgical History:  Past Surgical History:   Procedure Laterality Date   ? APPENDECTOMY     ? BREAST LUMPECTOMY  2009   ?  CARPAL TUNNEL RELEASE Left 2015   ? CHOLECYSTECTOMY OPEN  2019   ? ERCP  2019   ? ERCP  2019   ? GASTRIC BYPASS  2003   ? HEMIARTHROPLASTY HIP Right 2010   ? HYSTERECTOMY     ? INNER EAR SURGERY     ? IR IVC FILTER PLACEMENT Right 2019   ? REVISION TOTAL HIP ARTHROPLASTY Right 2011   ? TOTAL HIP ARTHROPLASTY Left 2011       Family History:   Family History   Problem Relation Age of Onset   ? Diabetes Mother    ? Hypertension Mother    ? Kidney cancer Mother    ? No Medical Problems Father    ? COPD Sister    ? Breast cancer Maternal Grandmother    ? No Medical Problems Paternal Grandmother    ? No Medical Problems Paternal Grandfather    ? Cancer Sister        Social History:    Social History     Socioeconomic History   ? Marital status:      Spouse name: Not on file   ? Number of children: Not on file   ? Years of education: Not on file   ? Highest education level: Not on file   Occupational History   ? Not on file   Social Needs   ? Financial resource strain: Not on file   ? Food insecurity:     Worry: Not on file     Inability: Not on file   ? Transportation needs:     Medical: Not on file     Non-medical: Not on file   Tobacco Use   ? Smoking status: Former Smoker     Last attempt to quit: 1965     Years since quittin.7   ? Smokeless tobacco: Never Used   Substance and Sexual Activity   ? Alcohol use: Not Currently     Alcohol/week: 4.0 standard drinks     Types: 4 Glasses of wine per week   ? Drug use: Not on file   ? Sexual activity: Not on file   Lifestyle   ? Physical activity:     Days per week: Not on file     Minutes per session: Not on file   ? Stress: Not on file   Relationships   ? Social connections:     Talks on phone: Not on file     Gets together: Not on file     Attends [employer] service: Not on file     Active member of club or organization: Not on file     Attends meetings of clubs or organizations: Not on file     Relationship status:  Not on file   ? Intimate partner violence:     Fear of current or ex partner: Not on file     Emotionally abused: Not on file     Physically abused: Not on file     Forced sexual activity: Not on file   Other Topics Concern   ? Not on file   Social History Narrative   ? Not on file          Review of Systems   Constitutional: Positive for activity change and fatigue. Negative for appetite change, chills, diaphoresis and fever.        Denies concerns   HENT: Negative for congestion and hearing loss.    Eyes: Negative.    Respiratory: Negative for shortness of breath and wheezing.    Cardiovascular: Positive for leg swelling.        Lymphedema   Gastrointestinal: Negative for abdominal distention, abdominal pain, constipation, diarrhea and nausea.   Endocrine: Negative.    Genitourinary: Negative for difficulty urinating.   Musculoskeletal:        Denies pain   Skin: Positive for wound.        Abdominal incision   Allergic/Immunologic: Negative.    Neurological: Negative for dizziness, tremors, speech difficulty and light-headedness.   Hematological: Negative.    Psychiatric/Behavioral: Negative for agitation, confusion, hallucinations and sleep disturbance. The patient is not nervous/anxious.        Vitals:    09/23/19 0759   BP: (!) 140/92   Pulse: 94   Resp: 16   Temp: 97  F (36.1  C)   SpO2: 93%   Weight: 128 lb (58.1 kg)       Physical Exam   Constitutional: No distress.   No acute issues   HENT:   Head: Normocephalic and atraumatic.   Mouth/Throat: Oropharynx is clear and moist. No oropharyngeal exudate.   Eyes: Right eye exhibits no discharge. Left eye exhibits no discharge. No scleral icterus.   Neck: Normal range of motion. Neck supple. No JVD present.   Cardiovascular: Normal rate. Exam reveals no gallop and no friction rub.   No murmur heard.  Pulmonary/Chest: Effort normal. No stridor. No respiratory distress. She has no wheezes. She has no rales.   Dim, RA   Abdominal: Soft. Bowel sounds are normal. She  exhibits no distension. There is no tenderness. There is no rebound.   Denies constipation or diarrhea, small fluid pocket in LUQ, stable   Genitourinary:    Genitourinary Comments: deferred   Musculoskeletal:         General: Edema present.      Comments: Lymphedema present, using wraps on RLE     Neurological: She is alert.   A/O x2, recall issues   Skin: Skin is warm and dry. She is not diaphoretic. No erythema.   Abdominal incision, staples and sutures removed, steri strips intact   Psychiatric: She has a normal mood and affect.   Denies anxiety or depression   Vitals reviewed.      Medication List:  Current Outpatient Medications   Medication Sig   ? cholecalciferol, vitamin D3, 1,000 unit (25 mcg) tablet Take 2,000 Units by mouth daily.   ? magnesium oxide (MAG-OX) 400 mg (241.3 mg magnesium) tablet Take 400 mg by mouth daily.   ? acetaminophen (TYLENOL) 500 MG tablet Take 500 mg by mouth every 4 (four) hours as needed for pain.   ? apixaban (ELIQUIS) 5 mg Tab tablet Take 5 mg by mouth 2 (two) times a day.   ? cyanocobalamin, vitamin B-12, 2,000 mcg Tab Take 1 tablet by mouth daily.   ? docusate sodium (COLACE) 100 MG capsule Take 100 mg by mouth 2 (two) times a day as needed for constipation.   ? levETIRAcetam (KEPPRA) 500 MG tablet Take 500 mg by mouth 3 (three) times a day.          ? losartan (COZAAR) 100 MG tablet Take 50 mg by mouth daily. Hold for SBP <110 or DBP <60         ? magnesium chloride (SLOW-MAG) 64 mg TbEC delayed-release tablet Take 64 mg by mouth 3 (three) times a day.   ? metoprolol succinate (TOPROL-XL) 50 MG 24 hr tablet Take 50 mg by mouth daily.          ? potassium chloride (KLOR-CON) 10 MEQ CR tablet Take 10 mEq by mouth 3 (three) times a day.          ? venlafaxine (EFFEXOR-XR) 150 MG 24 hr capsule Take 150 mg by mouth daily.       Labs:  Results for orders placed or performed in visit on 09/20/19   Basic Metabolic Panel   Result Value Ref Range    Sodium 137 136 - 145 mmol/L     Potassium 4.0 3.5 - 5.0 mmol/L    Chloride 105 98 - 107 mmol/L    CO2 25 22 - 31 mmol/L    Anion Gap, Calculation 7 5 - 18 mmol/L    Glucose 52 (LL) 70 - 125 mg/dL    Calcium 8.3 (L) 8.5 - 10.5 mg/dL    BUN 7 (L) 8 - 28 mg/dL    Creatinine 0.61 0.60 - 1.10 mg/dL    GFR MDRD Af Amer >60 >60 mL/min/1.73m2    GFR MDRD Non Af Amer >60 >60 mL/min/1.73m2     Lab Results   Component Value Date    WBC 6.8 09/13/2019    HGB 9.6 (L) 09/13/2019    HCT 29.7 (L) 09/13/2019    MCV 90 09/13/2019     09/13/2019     Lab Results   Component Value Date    TSH 8.80 (H) 09/17/2019       Vitamin D, Total (25-Hydroxy)   Date Value Ref Range Status   09/17/2019 24.2 (L) 30.0 - 80.0 ng/mL Final       Lab Results   Component Value Date    [id number] >2,000 (H) 09/17/2019         Assessment/Plan:    ERCP and cholecystectomy per open procedure: continue incisional cares, staples and suture removed, Steri-Strips intact, ordered to keep intact until 9/30, no f/u ordered    Poor nutrition: Prealbumin 10.1, PO intake improved    Pain control: Continue Tylenol 500 mg every 4 hours as needed, uses periodically    DVT prophylaxis: Continue apixaban 5 mg twice daily    Vitamin B12 deficiency: cyanocobalamin discontinued    Vit D def: started on D3 2000U daily    Hypo-kalemia: Increased potassium to 10 mEq 3 times daily, last K 4.0 on 9/20/19    Hypomagnesia: Increased to Slow-Mag 64 mg 3 times daily with mag oxide 400mg daily, last 1.7 on 9/20/19. Recheck with PCP    Seizure prophylaxis: Continue Keppra 500 mg 3 times daily    Subclinical hypothyroidism: Last TSH 8.8 with free T4 0.8    Hypertension: decreased losartan 50 mg daily (hold for SBP <110 and DBP <60) and decreased metoprolol tartrate to 50 mg daily, SBP <140    Constipation: Continue docusate 100 mg twice daily as needed    Depression: Continue venlafaxine 100 mg daily    Disposition: Plans to return to previous residence.  SLUMS 19/30 and CPT 4.6      Electronically signed by:  Octavio Vanegas, NP

## 2021-06-19 NOTE — LETTER
Letter by Octavio Vanegas NP at      Author: Octavio Vanegas NP Service: -- Author Type: --    Filed:  Encounter Date: 9/16/2019 Status: (Other)         Patient: Ne Monzon   MR Number: [id number]   YOB: 1945   Date of Visit: 9/16/2019     [facility]      Facility:    [facility] [[id number]]  Code Status: POLST AVAILABLE      Chief Complaint/Reason for Visit:   Chief Complaint   Patient presents with   ? Review Of Multiple Medical Conditions       HPI:   Ne is a 74 y.o. female who is a recent transfer from [facility] with admission on 8/28/2019 and discharged on 9/10/2019.  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  She was previously hospitalized at [facility] from 2/26/2019 to 3/1/2019 with extensive right lower extremity DVT involving IVC, right iliac femoral and distal veins.  She was then discharged on apixaban.  On 3/11/2019 underwent venogram thrombolytic and thrombectomy followed by venoplasty and IVC filter retrieval.  At that time she was diagnosed with a PE was choledocholithiasis causing bile duct dilation of 1.2cm.  GI attempted ERCP on 2/27/1990 with stent placement but was unsuccessful as the ampulla could not be cannulated.  It was then recommended that she have a laparoscopic assisted ERCP with lap amanda when safe to do so.  She did see Dr. Wick in clinic on 8/7/2019 plan was to hold on surgery as she was asymptomatic.  The night prior to admission developed post prandial right upper quadrant abdominal pain associate with fever and chills which prompted her ED presentation.  She was afebrile and had normal vitals, leukocytosis with a white count of 15 point 8T bili of 2.5D bili 1.9 ALP 1154, imaging showed  cholecystitis with hypertrophic gallbladder intra-and extrahepatic biliary dilatation as well as numerous common bile duct stones.  Was started on Zosyn and surgery was consulted.  Taken to the OR on 8/11/2019 for combined ERCP and cholecystectomy.  Laparoscopic removal was attempted though converted to open procedure due to failing of insufflation due to high intra-abdominal pressure and maximum dose of paralytics.  Gallbladder was distended though not particularly inflamed.  There was diffuse oozing from the gallbladder fossa, which was felt to be related to apixaban so a drain was placed.  Despite neuromuscular blockade reversal she was intubated overnight postoperatively and required Adan-Synephrine with extubation the following day.  Also given 1 unit packed red blood cell for hemoglobin of 6.9 and FFP vitamin K for INR reversal with an INR of 2.6 on 9/1/2019.  She did have her drain/Posada removed prior to discharge.  She was discharged to TCU for rehab.    Past Medical History:  Past Medical History:   Diagnosis Date   ? Acute deep vein thrombosis (DVT) of femoral vein of right lower extremity (H)    ? Anemia    ? Benign essential HTN    ? Bilateral pulmonary embolism (H)    ? BPV (benign positional vertigo), bilateral    ? Breast cancer (H)    ? Cataracts, bilateral    ? Choledocholithiasis with cholecystitis    ? Depression    ? Epilepsy, unspecified, intractable, without status epilepticus (H)    ? Estrogen receptor negative    ? Fx wrist    ? Gastric bypass status for obesity    ? GIST (gastrointestinal stromal tumor), non-malignant    ? HTN (hypertension)    ? Hyponatremia    ? Malignant neoplasm of breast (female) (H)    ? Obesity    ? Osteopenia    ? S/p bilateral carpal tunnel release    ? Seizures (H)    ? Systolic murmur    ? Unspecified disorder of ear, unspecified ear            Surgical History:  Past Surgical History:   Procedure Laterality Date   ? APPENDECTOMY     ? BREAST LUMPECTOMY  2009   ?  CARPAL TUNNEL RELEASE Left 2015   ? CHOLECYSTECTOMY OPEN  2019   ? ERCP  2019   ? ERCP  2019   ? GASTRIC BYPASS  2003   ? HEMIARTHROPLASTY HIP Right 2010   ? HYSTERECTOMY     ? INNER EAR SURGERY     ? IR IVC FILTER PLACEMENT Right 2019   ? REVISION TOTAL HIP ARTHROPLASTY Right 2011   ? TOTAL HIP ARTHROPLASTY Left 2011       Family History:   Family History   Problem Relation Age of Onset   ? Diabetes Mother    ? Hypertension Mother    ? Kidney cancer Mother    ? No Medical Problems Father    ? COPD Sister    ? Breast cancer Maternal Grandmother    ? No Medical Problems Paternal Grandmother    ? No Medical Problems Paternal Grandfather    ? Cancer Sister        Social History:    Social History     Socioeconomic History   ? Marital status:      Spouse name: Not on file   ? Number of children: Not on file   ? Years of education: Not on file   ? Highest education level: Not on file   Occupational History   ? Not on file   Social Needs   ? Financial resource strain: Not on file   ? Food insecurity:     Worry: Not on file     Inability: Not on file   ? Transportation needs:     Medical: Not on file     Non-medical: Not on file   Tobacco Use   ? Smoking status: Former Smoker     Last attempt to quit: 1965     Years since quittin.7   ? Smokeless tobacco: Never Used   Substance and Sexual Activity   ? Alcohol use: Not Currently     Alcohol/week: 2.4 oz     Types: 4 Glasses of wine per week   ? Drug use: Not on file   ? Sexual activity: Not on file   Lifestyle   ? Physical activity:     Days per week: Not on file     Minutes per session: Not on file   ? Stress: Not on file   Relationships   ? Social connections:     Talks on phone: Not on file     Gets together: Not on file     Attends [employer] service: Not on file     Active member of club or organization: Not on file     Attends meetings of clubs or organizations: Not on file     Relationship status: Not on file    ? Intimate partner violence:     Fear of current or ex partner: Not on file     Emotionally abused: Not on file     Physically abused: Not on file     Forced sexual activity: Not on file   Other Topics Concern   ? Not on file   Social History Narrative   ? Not on file          Review of Systems   Constitutional: Positive for activity change and fatigue. Negative for appetite change, chills, diaphoresis and fever.        Denies concerns   HENT: Negative for congestion and hearing loss.    Eyes: Negative.    Respiratory: Negative for shortness of breath and wheezing.    Cardiovascular: Positive for leg swelling.   Gastrointestinal: Negative for abdominal distention, abdominal pain, constipation, diarrhea and nausea.   Endocrine: Negative.    Genitourinary: Negative for difficulty urinating.   Musculoskeletal:        Denies pain   Skin: Positive for wound.        Abdominal incision   Allergic/Immunologic: Negative.    Neurological: Negative for dizziness, tremors, speech difficulty and light-headedness.   Hematological: Negative.    Psychiatric/Behavioral: Negative for agitation, confusion, hallucinations and sleep disturbance. The patient is not nervous/anxious.        Vitals:    09/16/19 1134   BP: 123/87   Pulse: 80   Resp: 16   Temp: 98  F (36.7  C)   SpO2: 99%   Weight: 128 lb (58.1 kg)       Physical Exam   Constitutional: No distress.   No acute issues   HENT:   Head: Normocephalic and atraumatic.   Mouth/Throat: Oropharynx is clear and moist. No oropharyngeal exudate.   Eyes: Right eye exhibits no discharge. Left eye exhibits no discharge. No scleral icterus.   Neck: Normal range of motion. Neck supple. No JVD present.   Cardiovascular: Normal rate. Exam reveals no gallop and no friction rub.   No murmur heard.  Pulmonary/Chest: Effort normal. No stridor. No respiratory distress. She has no wheezes. She has no rales.   Dim, RA   Abdominal: Soft. Bowel sounds are normal. She exhibits no distension. There is no  tenderness. There is no rebound.   Denies constipation or diarrhea   Genitourinary:   Genitourinary Comments: deferred   Musculoskeletal: She exhibits edema.   Lymphedema present, using wraps   Neurological: She is alert.   A/O x2, recall issues   Skin: Skin is warm and dry. She is not diaphoretic. No erythema.   Abdominal incision, staples present   Psychiatric: She has a normal mood and affect.   Denies anxiety or depression   Vitals reviewed.      Medication List:  Current Outpatient Medications   Medication Sig   ? magnesium chloride (SLOW-MAG) 64 mg TbEC delayed-release tablet Take 64 mg by mouth 3 (three) times a day.   ? acetaminophen (TYLENOL) 500 MG tablet Take 500 mg by mouth every 4 (four) hours as needed for pain.   ? apixaban (ELIQUIS) 5 mg Tab tablet Take 5 mg by mouth 2 (two) times a day.   ? cyanocobalamin, vitamin B-12, 2,000 mcg Tab Take 1 tablet by mouth daily.   ? docusate sodium (COLACE) 100 MG capsule Take 100 mg by mouth 2 (two) times a day as needed for constipation.   ? levETIRAcetam (KEPPRA) 500 MG tablet Take 500 mg by mouth 3 (three) times a day.          ? losartan (COZAAR) 100 MG tablet Take 100 mg by mouth daily.   ? metoprolol succinate (TOPROL-XL) 50 MG 24 hr tablet Take 50 mg by mouth daily.          ? potassium chloride (KLOR-CON) 10 MEQ CR tablet Take 10 mEq by mouth 3 (three) times a day.          ? venlafaxine (EFFEXOR-XR) 150 MG 24 hr capsule Take 150 mg by mouth daily.       Labs:  Results for orders placed or performed in visit on 09/13/19   Basic Metabolic Panel   Result Value Ref Range    Sodium 134 (L) 136 - 145 mmol/L    Potassium 3.4 (L) 3.5 - 5.0 mmol/L    Chloride 103 98 - 107 mmol/L    CO2 25 22 - 31 mmol/L    Anion Gap, Calculation 6 5 - 18 mmol/L    Glucose 65 (L) 70 - 125 mg/dL    Calcium 7.7 (L) 8.5 - 10.5 mg/dL    BUN 8 8 - 28 mg/dL    Creatinine 0.52 (L) 0.60 - 1.10 mg/dL    GFR MDRD Af Amer >60 >60 mL/min/1.73m2    GFR MDRD Non Af Amer >60 >60 mL/min/1.73m2      Lab Results   Component Value Date    WBC 6.8 09/13/2019    HGB 9.6 (L) 09/13/2019    HCT 29.7 (L) 09/13/2019    MCV 90 09/13/2019     09/13/2019     No results found for: TSH    No results found for: [id number]    No results found for: [id number]      Assessment/Plan:    ERCP and cholecystectomy per open procedure: continue incisional cares, fu with surgeon on 9/16/19    Poor nutrition: Prealbumin 10.1, dietitian consulted    Pain control: Continue Tylenol 500 mg every 4 hours as needed, uses periodically    DVT prophylaxis: Continue apixaban 5 mg twice daily    Vitamin B12 deficiency: Continue cyanocobalamin 2000mcg daily    Hypo-kalemia: Increased potassium to 10 mEq 3 times daily, last K 3.4, recheck in 1 week    Hypomagnesia: Increase to Slow-Mag 64 mg 3 times daily, last 1.4, recheck in 1 week    Seizure prophylaxis: Continue Keppra 500 mg 3 times daily    Hypertension: Continue losartan 100 mg daily and decrease metoprolol tartrate to 50 mg daily, monitor blood pressure twice daily x1 week    Constipation: Continue docusate 100 mg twice daily as needed    Depression: Continue venlafaxine 100 mg daily    Disposition: Plans to return to previous residence.  Pending cognitive assessment    The care plan has been reviewed and all orders signed. Changes to care plan, if any, as noted. Otherwise, continue care plan of care.  The total time spent with this patient was 35 minutes, with greater than 50% spent in counseling and coordination of care that included multiple issues per surgeon follow-up, pain control, therapy, blood pressure monitoring and electrolyte replacement which lasted 18 minutes.    Post Discharge Medication Reconciliation Status: discharge medications reconciled, continue medications without change      Electronically signed by: Octavio Vanegas NP

## 2021-06-19 NOTE — LETTER
Letter by Lizeth Bui MBBS at      Author: Lizeth Bui MBBS Service: -- Author Type: --    Filed:  Encounter Date: 9/18/2019 Status: (Other)         Patient: Ne Monzon   MR Number: [id number]   YOB: 1945   Date of Visit: 9/18/2019       [facility] Admission note      Patient: Ne Monzon  MRN: [id number]  Date of Service: 9/18/2019      [facility] [[id number]]  Reason for Visit     Chief Complaint   Patient presents with   ? Review Of Multiple Medical Conditions       Code Status     Full code    Assessment     -History of choledocholithiasis with imaging showing cholelithiasis with hydropic gallbladder; intra-as well as extrahepatic biliary dilatation and numerous CBD stone   -status post laparoscopic converted to open cholecystectomy on 8/30/2019  -Status post laparoscopic surgically assisted ERCP on 8/30/2019  -Prolonged postoperative episode of respiratory failure requiring intubation with hypotension  -Acute blood loss anemia with hemoglobin dropped to 6.9 requiring 1 unit of packed RBCs.  She was also given FFP's and vitamin K for elevated INR  -Posada placement secondary to urinary retention  -Status post removal of SHEILA drain on 9/9/2019  -Baseline cognitive impairment with limited recall noted on exam today  -History of epilepsy currently on Keppra with no breakthrough seizures reported  -History of gastric bypass secondary to obesity  -History of DVT/PE on chronic anticoagulation  Profound debilitation-    Plan     Patient is currently admitted to the TCU  Patient was examined in her care plan was asked reviewed with her and her .  Her recall of recent events is limited.  Cognitively remains impaired with a slums of 1930.   is aware of her deficits overall mood and behavior has been stable.  She does have a history of hypertension and remains on losartan and metoprolol.  Recently due to low blood pressures metoprolol was reduced to 50 mg once  a day.  She has hold parameters given today and yesterday her blood pressures have been been consistently low.  Plan is to reduce losartan to 50 mg daily and monitor closely.  Due to supratherapeutic B12 levels will discontinue her B12 supplementation.  Start her on vitamin D supplementation due to low levels at 2000 units daily  Her TSH is slightly elevated at 8.8 but her free T4 is still within normal limits  Advised patient has been that she may need a repeat thyroid panel check done in a few weeks she is at risk of getting hypothyroid      History     Patient is a very pleasant 74 y.o. female who is admitted to TCU  She has a known history of choledocholithiasis with biliary duct dilatation.  She was asymptomatic but presented to the hospital with postprandial right upper quadrant abdominal pain with chills and nausea.  She was admitted with acute cholecystitis.  CT showed cholelithiasis with hydropic gallbladder with both intra-and extrahepatic biliary dilatation and numerous CBD stones.  She underwent cholecystectomy /ERCP on 8/30/2019.  Initially a laparoscopic procedure was attempted but was unsuccessful and she had an open procedure.  She is currently doing well incision was examined and is healing well.  She has no pain  Discontinued  Has some cognitive impairments.  Her  is aware of those her slums was 19/30 CPT is still pending.  Physically she is doing well she is ambulating over 150 feet and feels her strength is returning quite well.   is quite concerned about her leg swelling he has noticed that they put Ace wraps on his legs.  He is wondering if he could put Tubigrip's or other compression hose that he feels this is not effective for her.  She is having frequent low blood pressures her metoprolol dosage was reduced to 50 mg daily in spite of that her blood pressure was only 103 systolic.  She is being monitored closely for blood pressures twice a day    Past Medical History       Past  Medical History:   Diagnosis Date   ? Acute deep vein thrombosis (DVT) of femoral vein of right lower extremity (H)    ? Anemia    ? Benign essential HTN    ? Bilateral pulmonary embolism (H)    ? BPV (benign positional vertigo), bilateral    ? Breast cancer (H)    ? Cataracts, bilateral    ? Choledocholithiasis with cholecystitis    ? Depression    ? Epilepsy, unspecified, intractable, without status epilepticus (H)    ? Estrogen receptor negative    ? Fx wrist    ? Gastric bypass status for obesity    ? GIST (gastrointestinal stromal tumor), non-malignant    ? HTN (hypertension)    ? Hyponatremia    ? Malignant neoplasm of breast (female) (H)    ? Obesity    ? Osteopenia    ? S/p bilateral carpal tunnel release    ? Seizures (H)    ? Systolic murmur    ? Unspecified disorder of ear, unspecified ear        Past Social History     Reviewed, and she  reports that she quit smoking about 54 years ago. She has never used smokeless tobacco. She reports that she drank about 2.4 oz of alcohol per week.    Family History     Reviewed, and includes a history of kidney cancer in her mother  Her mother also had hypertension and diabetes  Sister has COPD.  There is history of breast cancer in her maternal grandmother as well as in her sister    Medication List        Medication List           Accurate as of 9/18/19 10:47 AM. If you have any questions, ask your nurse or doctor.               CONTINUE taking these medications    acetaminophen 500 MG tablet  Commonly known as:  TYLENOL     apixaban 5 mg Tab tablet  Commonly known as:  ELIQUIS     cyanocobalamin (vitamin B-12) 2,000 mcg Tab     docusate sodium 100 MG capsule  Commonly known as:  COLACE     levETIRAcetam 500 MG tablet  Commonly known as:  KEPPRA     losartan 100 MG tablet  Commonly known as:  COZAAR     magnesium chloride 64 mg Tbec delayed-release tablet  Commonly known as:  SLOW-MAG     metoprolol succinate 50 MG 24 hr tablet  Commonly known as:  TOPROL-XL      potassium chloride 10 MEQ CR tablet  Commonly known as:  KLOR-CON     venlafaxine 150 MG 24 hr capsule  Commonly known as:  EFFEXOR-XR            Allergies     No Known Allergies    Review of Systems   A comprehensive review of 14 systems was done. Pertinent findings noted here and in history of present illness. All the rest negative.  Constitutional: Negative.  Negative for fever, chills, she has activity change, appetite change and fatigue.   HENT: Negative for congestion and facial swelling.    Eyes: Negative for photophobia, redness and visual disturbance.   Respiratory: Negative for cough and chest tightness.    Cardiovascular: Negative for chest pain, palpitations and has chronic leg swelling.   Gastrointestinal: Negative for nausea, diarrhea, constipation, blood in stool and abdominal distention.   Genitourinary: Negative.    Musculoskeletal: Negative.  Very weak and difficulty walking   Skin: Negative.    Neurological: Negative for dizziness, tremors, syncope, weakness, light-headedness and headaches.   Hematological: Does not bruise/bleed easily.   Psychiatric/Behavioral: Negative.  Recall is impaired       Physical Exam     Blood pressure 111/79 temp 98 pulse 82 no current weights are available for review    Constitutional: Oriented to person, place, and time and appears well-developed.   HEENT:  Normocephalic and atraumatic.  Eyes: Conjunctivae and EOM are normal. Pupils are equal, round, and reactive to light. No discharge.  No scleral icterus. Nose normal. Mouth/Throat: Oropharynx is clear and moist. No oropharyngeal exudate.    NECK: Normal range of motion. Neck supple. No JVD present. No tracheal deviation present. No thyromegaly present.   CARDIOVASCULAR: Normal rate, regular rhythm and intact distal pulses.  Exam reveals no gallop and no friction rub.  Systolic murmur present.  PULMONARY: Effort normal and breath sounds normal. No respiratory distress.No Wheezing or rales.  ABDOMEN: Soft. Bowel  sounds are normal. No distension and no mass.  There is no tenderness. There is no rebound and no guarding. No HSM.  MUSCULOSKELETAL: Normal range of motion.  She has 2+ leg edema and no tenderness. Mild kyphosis, no tenderness.  LYMPH NODES: Has no cervical, supraclavicular, axillary and groin adenopathy.   NEUROLOGICAL: Alert and oriented to person, place, and time. No cranial nerve deficit.  Normal muscle tone. Coordination normal.   GENITOURINARY: Deferred exam.  SKIN: Skin is warm and dry. No rash noted. No erythema. No pallor.   EXTREMITIES: No cyanosis, no clubbing, has 2+ leg edema. No Deformity.  PSYCHIATRIC: Normal mood, affect and behavior.  Recall is impaired      Lab Results     GGT 1617  HG 9.5  ; K4.1 CREAT 0.5        Post Discharge Medication Reconciliation Status: discharge medications reconciled and changed, per note/orders (see AVS)          KAMILLA Lacy

## 2021-06-19 NOTE — LETTER
Letter by Octavio Vanegas NP at      Author: Octavio Vanegas NP Service: -- Author Type: --    Filed:  Encounter Date: 9/25/2019 Status: Signed         Patient: Ne Monzon   MR Number: [id number]   YOB: 1945   Date of Visit: 9/25/2019     [facility]      Facility:    [facility] [[id number]]  Code Status: POLST AVAILABLE      Chief Complaint/Reason for Visit:   Chief Complaint   Patient presents with   ? Discharge Summary       HPI:   Ne is a 74 y.o. female who is a recent transfer from [facility] with admission on 8/28/2019 and discharged on 9/10/2019.  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  She was previously hospitalized at [facility] from 2/26/2019 to 3/1/2019 with extensive right lower extremity DVT involving IVC, right iliac femoral and distal veins.  She was then discharged on apixaban.  On 3/11/2019 underwent venogram thrombolytic and thrombectomy followed by venoplasty and IVC filter retrieval.  At that time she was diagnosed with a PE was choledocholithiasis causing bile duct dilation of 1.2cm.  GI attempted ERCP on 2/27/1990 with stent placement but was unsuccessful as the ampulla could not be cannulated.  It was then recommended that she have a laparoscopic assisted ERCP with lap amanda when safe to do so.  She did see Dr. Wick in clinic on 8/7/2019 plan was to hold on surgery as she was asymptomatic.  The night prior to admission developed post prandial right upper quadrant abdominal pain associate with fever and chills which prompted her ED presentation.  She was afebrile and had normal vitals, leukocytosis with a white count of 15 point 8T bili of 2.5D bili 1.9 ALP 1154, imaging showed cholecystitis with  hypertrophic gallbladder intra-and extrahepatic biliary dilatation as well as numerous common bile duct stones.  Was started on Zosyn and surgery was consulted.  Taken to the OR on 8/11/2019 for combined ERCP and cholecystectomy.  Laparoscopic removal was attempted though converted to open procedure due to failing of insufflation due to high intra-abdominal pressure and maximum dose of paralytics.  Gallbladder was distended though not particularly inflamed.  There was diffuse oozing from the gallbladder fossa, which was felt to be related to apixaban so a drain was placed.  Despite neuromuscular blockade reversal she was intubated overnight postoperatively and required Adan-Synephrine with extubation the following day.  Also given 1 unit packed red blood cell for hemoglobin of 6.9 and FFP vitamin K for INR reversal with an INR of 2.6 on 9/1/2019.  She did have her drain/Posada removed prior to discharge.  She was discharged to TCU for rehab.    She has concluded her TCU stay and will be discharged home on 9/27/2019 with services.    Past Medical History:  Past Medical History:   Diagnosis Date   ? Acute deep vein thrombosis (DVT) of femoral vein of right lower extremity (H)    ? Anemia    ? Benign essential HTN    ? Bilateral pulmonary embolism (H)    ? BPV (benign positional vertigo), bilateral    ? Breast cancer (H)    ? Cataracts, bilateral    ? Choledocholithiasis with cholecystitis    ? Depression    ? Epilepsy, unspecified, intractable, without status epilepticus (H)    ? Estrogen receptor negative    ? Fx wrist    ? Gastric bypass status for obesity    ? GIST (gastrointestinal stromal tumor), non-malignant    ? HTN (hypertension)    ? Hyponatremia    ? Malignant neoplasm of breast (female) (H)    ? Obesity    ? Osteopenia    ? S/p bilateral carpal tunnel release    ? Seizures (H)    ? Systolic murmur    ? Unspecified disorder of ear, unspecified ear            Surgical History:  Past Surgical History:    Procedure Laterality Date   ? APPENDECTOMY     ? BREAST LUMPECTOMY     ? CARPAL TUNNEL RELEASE Left 2015   ? CHOLECYSTECTOMY OPEN  2019   ? ERCP  2019   ? ERCP  2019   ? GASTRIC BYPASS     ? HEMIARTHROPLASTY HIP Right 2010   ? HYSTERECTOMY     ? INNER EAR SURGERY     ? IR IVC FILTER PLACEMENT Right 2019   ? REVISION TOTAL HIP ARTHROPLASTY Right 2011   ? TOTAL HIP ARTHROPLASTY Left 2011       Family History:   Family History   Problem Relation Age of Onset   ? Diabetes Mother    ? Hypertension Mother    ? Kidney cancer Mother    ? No Medical Problems Father    ? COPD Sister    ? Breast cancer Maternal Grandmother    ? No Medical Problems Paternal Grandmother    ? No Medical Problems Paternal Grandfather    ? Cancer Sister        Social History:    Social History     Socioeconomic History   ? Marital status:      Spouse name: Not on file   ? Number of children: Not on file   ? Years of education: Not on file   ? Highest education level: Not on file   Occupational History   ? Not on file   Social Needs   ? Financial resource strain: Not on file   ? Food insecurity:     Worry: Not on file     Inability: Not on file   ? Transportation needs:     Medical: Not on file     Non-medical: Not on file   Tobacco Use   ? Smoking status: Former Smoker     Last attempt to quit: 1965     Years since quittin.7   ? Smokeless tobacco: Never Used   Substance and Sexual Activity   ? Alcohol use: Not Currently     Alcohol/week: 4.0 standard drinks     Types: 4 Glasses of wine per week   ? Drug use: Not on file   ? Sexual activity: Not on file   Lifestyle   ? Physical activity:     Days per week: Not on file     Minutes per session: Not on file   ? Stress: Not on file   Relationships   ? Social connections:     Talks on phone: Not on file     Gets together: Not on file     Attends Zoroastrian service: Not on file     Active member of club or organization: Not on file      Attends meetings of clubs or organizations: Not on file     Relationship status: Not on file   ? Intimate partner violence:     Fear of current or ex partner: Not on file     Emotionally abused: Not on file     Physically abused: Not on file     Forced sexual activity: Not on file   Other Topics Concern   ? Not on file   Social History Narrative   ? Not on file          Review of Systems   Constitutional: Positive for activity change and fatigue. Negative for appetite change, chills, diaphoresis and fever.        Denies concerns   HENT: Negative for congestion and hearing loss.    Eyes: Negative.    Respiratory: Negative for shortness of breath and wheezing.    Cardiovascular: Positive for leg swelling.        Lymphedema   Gastrointestinal: Negative for abdominal distention, abdominal pain, constipation, diarrhea and nausea.   Endocrine: Negative.    Genitourinary: Negative for difficulty urinating.   Musculoskeletal:        Denies pain   Skin: Positive for wound.        Abdominal incision   Allergic/Immunologic: Negative.    Neurological: Negative for dizziness, tremors, speech difficulty and light-headedness.   Hematological: Negative.    Psychiatric/Behavioral: Negative for agitation, confusion, hallucinations and sleep disturbance. The patient is not nervous/anxious.        Vitals:    09/25/19 1220   BP: 125/86   Pulse: 97   Resp: 16   Temp: 98  F (36.7  C)   SpO2: 97%   Weight: 128 lb (58.1 kg)       Physical Exam   Constitutional: No distress.   No acute issues   HENT:   Head: Normocephalic and atraumatic.   Mouth/Throat: Oropharynx is clear and moist. No oropharyngeal exudate.   Eyes: Right eye exhibits no discharge. Left eye exhibits no discharge. No scleral icterus.   Neck: Normal range of motion. Neck supple. No JVD present.   Cardiovascular: Normal rate. Exam reveals no gallop and no friction rub.   No murmur heard.  Pulmonary/Chest: Effort normal. No stridor. No respiratory distress. She has no wheezes.  She has no rales.   Dim, RA   Abdominal: Soft. Bowel sounds are normal. She exhibits no distension. There is no tenderness. There is no rebound.   Denies constipation or diarrhea, small fluid pocket in LUQ, stable   Genitourinary:    Genitourinary Comments: deferred   Musculoskeletal:         General: Edema present.      Comments: Lymphedema present, using wraps on RLE     Neurological: She is alert.   A/O x2, recall issues   Skin: Skin is warm and dry. She is not diaphoretic. No erythema.   Abdominal incision, staples and sutures removed, steri strips intact   Psychiatric: She has a normal mood and affect.   Denies anxiety or depression   Vitals reviewed.      Medication List:  Current Outpatient Medications   Medication Sig   ? acetaminophen (TYLENOL) 500 MG tablet Take 500 mg by mouth every 4 (four) hours as needed for pain.   ? apixaban (ELIQUIS) 5 mg Tab tablet Take 5 mg by mouth 2 (two) times a day.   ? cholecalciferol, vitamin D3, 1,000 unit (25 mcg) tablet Take 2,000 Units by mouth daily.   ? docusate sodium (COLACE) 100 MG capsule Take 100 mg by mouth 2 (two) times a day as needed for constipation.   ? levETIRAcetam (KEPPRA) 500 MG tablet Take 500 mg by mouth 3 (three) times a day.          ? losartan (COZAAR) 100 MG tablet Take 50 mg by mouth daily. Hold for SBP <110 or DBP <60         ? magnesium chloride (SLOW-MAG) 64 mg TbEC delayed-release tablet Take 64 mg by mouth 3 (three) times a day.   ? magnesium oxide (MAG-OX) 400 mg (241.3 mg magnesium) tablet Take 400 mg by mouth daily.   ? metoprolol succinate (TOPROL-XL) 50 MG 24 hr tablet Take 50 mg by mouth daily.          ? potassium chloride (KLOR-CON) 10 MEQ CR tablet Take 10 mEq by mouth 3 (three) times a day.          ? venlafaxine (EFFEXOR-XR) 150 MG 24 hr capsule Take 150 mg by mouth daily.       Labs:  Results for orders placed or performed in visit on 09/20/19   Basic Metabolic Panel   Result Value Ref Range    Sodium 137 136 - 145 mmol/L     Potassium 4.0 3.5 - 5.0 mmol/L    Chloride 105 98 - 107 mmol/L    CO2 25 22 - 31 mmol/L    Anion Gap, Calculation 7 5 - 18 mmol/L    Glucose 52 (LL) 70 - 125 mg/dL    Calcium 8.3 (L) 8.5 - 10.5 mg/dL    BUN 7 (L) 8 - 28 mg/dL    Creatinine 0.61 0.60 - 1.10 mg/dL    GFR MDRD Af Amer >60 >60 mL/min/1.73m2    GFR MDRD Non Af Amer >60 >60 mL/min/1.73m2     Lab Results   Component Value Date    WBC 6.8 09/13/2019    HGB 9.6 (L) 09/13/2019    HCT 29.7 (L) 09/13/2019    MCV 90 09/13/2019     09/13/2019     Lab Results   Component Value Date    TSH 8.80 (H) 09/17/2019       Vitamin D, Total (25-Hydroxy)   Date Value Ref Range Status   09/17/2019 24.2 (L) 30.0 - 80.0 ng/mL Final       Lab Results   Component Value Date    [id number] >2,000 (H) 09/17/2019         Assessment/Plan:    ERCP and cholecystectomy per open procedure: continue incisional cares, staples and suture removed, Steri-Strips intact, ordered to keep intact until 9/30, no f/u ordered    Poor nutrition: Prealbumin 10.1, PO intake improved    Pain control: Continue Tylenol 500 mg every 4 hours as needed, uses periodically    DVT prophylaxis: Continue apixaban 5 mg twice daily    Vitamin B12 deficiency: cyanocobalamin discontinued    Vit D def: started on D3 2000U daily    Hypokalemia: Increased potassium to 10 mEq 3 times daily, last K 4.0 on 9/20/19    Hypomagnesia: Increased to Slow-Mag 64 mg 3 times daily with mag oxide 400mg daily, last 1.7 on 9/20/19. Recheck with PCP    Seizure prophylaxis: Continue Keppra 500 mg 3 times daily    Subclinical hypothyroidism: Last TSH 8.8 with free T4 0.8    Hypertension: decreased losartan 50 mg daily (held for SBP <110 and DBP <60) and decreased metoprolol tartrate to 50 mg daily, SBP <140    Constipation: Continue docusate 100 mg twice daily as needed    Depression: Continue venlafaxine 100 mg daily    Disposition: Plans to return to home with services on 9/27/2019. UMS 19/30 and CPT 4.6    MEDICAL EQUIPMENT  NEEDS:  na    DISCHARGE PLAN/FACE TO FACE:  I certify that services are/were furnished while this patient was under the care of a physician and that a physician or an allowed non-physician practitioner (NPP), had a face-to-face encounter that meets the physician face-to-face encounter requirements. The encounter was in whole, or in part, related to the primary reason for home health. The patient is confined to his/her home and needs intermittent skilled nursing, physical therapy, speech-language pathology, or the continued need for occupational therapy. A plan of care has been established by a physician and is periodically reviewed by a physician.  Date of Face-to-Face Encounter: 9/25/2019    I certify that, based on my findings, the following services are medically necessary home health services: HHC HHA and PT/OT to evaluate treat at home.    My clinical findings support the need for the above skilled services because: patient will be discharging to home.  Patient is assistance with performing IADLs and ADLs effectively and safely at home.    Patient to re-establish plan of care with their PCP within 7 days after leaving TCU.     The care plan has been reviewed and all orders signed. Changes to care plan, if any, as noted. Otherwise, continue care plan of care.  The total time spent with this patient was 31 minutes, with greater than 50% spent in counseling and coordination of care that included multiple issues per keeping Steri-Strips intact until 9/30, improving p.o. intake and continuing therapy at home which lasted 16 minutes.      Electronically signed by: Octavio Vanegas NP

## 2021-06-19 NOTE — LETTER
Letter by Lizeth Bui MBBS at      Author: Lizeth Bui MBBS Service: -- Author Type: --    Filed:  Encounter Date: 9/11/2019 Status: (Other)         Patient: Ne Monzon   MR Number: [id number]   YOB: 1945   Date of Visit: 9/11/2019       [facility] Admission note      Patient: Ne Monzon  MRN: [id number]  Date of Service: 9/11/2019      [facility] [[id number]]  Reason for Visit     Chief Complaint   Patient presents with   ? H & P       Code Status     Full code    Assessment     -History of choledocholithiasis with imaging showing cholelithiasis with hydropic gallbladder; intra-as well as extrahepatic biliary dilatation and numerous CBD stone   -status post laparoscopic converted to open cholecystectomy on 8/30/2019  -Status post laparoscopic surgically assisted ERCP on 8/30/2019  -Prolonged postoperative episode of respiratory failure requiring intubation with hypotension  -Acute blood loss anemia with hemoglobin dropped to 6.9 requiring 1 unit of packed RBCs.  She was also given FFP's and vitamin K for elevated INR  -Posada placement secondary to urinary retention  -Status post removal of SHEILA drain on 9/9/2019  -Baseline cognitive impairment with limited recall noted on exam today  -History of epilepsy currently on Keppra with no breakthrough seizures reported  -History of gastric bypass secondary to obesity  -History of DVT/PE on chronic anticoagulation  Profound debilitation-    Plan     Patient is currently admitted to the TCU  Patient was examined in her care plan was asked reviewed with her and her .  Her recall of recent events is limited.   indicated that at baseline she has some deficits with those.  She has been having some ongoing cognitive decline but he feels she is still not back to baseline.  Her surgical incision is intact with staples and they have a follow-up appointment with the surgeon.  Pain management reviewed with both of them  and she has some Tylenol and oxycodone she is not reporting much pain though.  She is tolerating a regular diet with no concerns.   is making sure she gets a good diet and.  We did talk about malnutrition concerns and risk of ascites.  If he or she notices any edema they will let me know.  She does have significant lymphedema.  We will recheck labs closely she had anemia with effusion given which she was not aware of.  She has continued on her apixaban with underlying history of DVT and PE  Monitor blood pressures closely she is on both metoprolol and high doses of losartan.  So her blood pressures are low suspect due to poor intake.  If they continue to be low she may need to have medications being held  Total time spent is 45 minutes with more than 30 minutes spent face-to-face talking to the patient in the presence of her .  Care concerns including cognitive status and confusion issues were reviewed with both patient and .   is aware and feels she is still not back to baseline.  Also reviewed with pain management concerns and risk of malnutrition and ascites  Pain management also reviewed with both of them    History     Patient is a very pleasant 74 y.o. female who is admitted to TCU  She has a known history of choledocholithiasis with biliary duct dilatation.  She was asymptomatic but presented to the hospital with postprandial right upper quadrant abdominal pain with chills and nausea.  She was admitted with acute cholecystitis.  CT showed cholelithiasis with hydropic gallbladder with both intra-and extrahepatic biliary dilatation and numerous CBD stones.  She underwent cholecystectomy /ERCP on 8/30/2019.  Initially a laparoscopic procedure was attempted but was unsuccessful and she had an open procedure.  Postprocedure she had prolonged respiratory failure requiring prolonged ICU stay and intubation.  She was also significantly hypotensive but eventually was able to wean  She  became anemic hemoglobin dropped down to 6.9 and she received 1 unit of packed RBC blood transfusion.  She was also noted to have an elevated INR of 2.6 and given vitamin K as well as fresh frozen plasma  Eventually her SHEILA drain was removed on 9 9 followed by general surgery who felt she was safe to discharge.  She remains at risk for ascites due to hypoalbuminemia.  She may require Lasix as per her surgeon  Due to low magnesium she remains on supplementation  She remains somewhat confused and has limited recall of recent events.   present at bedside did update me that these are baseline and he is aware of those deficits    Past Medical History       Past Medical History:   Diagnosis Date   ? Acute deep vein thrombosis (DVT) of femoral vein of right lower extremity (H)    ? Anemia    ? Benign essential HTN    ? Bilateral pulmonary embolism (H)    ? BPV (benign positional vertigo), bilateral    ? Breast cancer (H)    ? Cataracts, bilateral    ? Choledocholithiasis with cholecystitis    ? Depression    ? Epilepsy, unspecified, intractable, without status epilepticus (H)    ? Estrogen receptor negative    ? Fx wrist    ? Gastric bypass status for obesity    ? GIST (gastrointestinal stromal tumor), non-malignant    ? HTN (hypertension)    ? Hyponatremia    ? Malignant neoplasm of breast (female) (H)    ? Obesity    ? Osteopenia    ? S/p bilateral carpal tunnel release    ? Seizures (H)    ? Systolic murmur    ? Unspecified disorder of ear, unspecified ear        Past Social History     Reviewed, and she  reports that she quit smoking about 54 years ago. She has never used smokeless tobacco. She reports that she drank about 2.4 oz of alcohol per week.    Family History     Reviewed, and includes a history of kidney cancer in her mother  Her mother also had hypertension and diabetes  Sister has COPD.  There is history of breast cancer in her maternal grandmother as well as in her sister    Medication List         Medication List           Accurate as of 9/11/19  9:25 PM. If you have any questions, ask your nurse or doctor.               CONTINUE taking these medications    acetaminophen 500 MG tablet  Commonly known as:  TYLENOL     apixaban 5 mg Tab tablet  Commonly known as:  ELIQUIS     cyanocobalamin (vitamin B-12) 2,000 mcg Tab     docusate sodium 100 MG capsule  Commonly known as:  COLACE     levETIRAcetam 500 MG tablet  Commonly known as:  KEPPRA     losartan 100 MG tablet  Commonly known as:  COZAAR     metoprolol succinate 50 MG 24 hr tablet  Commonly known as:  TOPROL-XL     MULTIPLE VITAMIN ORAL     oxyCODONE 5 MG immediate release tablet  Commonly known as:  ROXICODONE     potassium chloride 10 MEQ CR tablet  Commonly known as:  KLOR-CON     venlafaxine 150 MG 24 hr capsule  Commonly known as:  EFFEXOR-XR            Allergies     No Known Allergies    Review of Systems   A comprehensive review of 14 systems was done. Pertinent findings noted here and in history of present illness. All the rest negative.  Constitutional: Negative.  Negative for fever, chills, she has activity change, appetite change and fatigue.   HENT: Negative for congestion and facial swelling.    Eyes: Negative for photophobia, redness and visual disturbance.   Respiratory: Negative for cough and chest tightness.    Cardiovascular: Negative for chest pain, palpitations and has chronic leg swelling.   Gastrointestinal: Negative for nausea, diarrhea, constipation, blood in stool and abdominal distention.   Genitourinary: Negative.    Musculoskeletal: Negative.  Very weak and difficulty walking   Skin: Negative.    Neurological: Negative for dizziness, tremors, syncope, weakness, light-headedness and headaches.   Hematological: Does not bruise/bleed easily.   Psychiatric/Behavioral: Negative.  Recall is impaired       Physical Exam     Blood pressure 111/79 temp 98 pulse 82 no current weights are available for review    Constitutional: Oriented  to person, place, and time and appears well-developed.   HEENT:  Normocephalic and atraumatic.  Eyes: Conjunctivae and EOM are normal. Pupils are equal, round, and reactive to light. No discharge.  No scleral icterus. Nose normal. Mouth/Throat: Oropharynx is clear and moist. No oropharyngeal exudate.    NECK: Normal range of motion. Neck supple. No JVD present. No tracheal deviation present. No thyromegaly present.   CARDIOVASCULAR: Normal rate, regular rhythm and intact distal pulses.  Exam reveals no gallop and no friction rub.  Systolic murmur present.  PULMONARY: Effort normal and breath sounds normal. No respiratory distress.No Wheezing or rales.  ABDOMEN: Soft. Bowel sounds are normal. No distension and no mass.  There is no tenderness. There is no rebound and no guarding. No HSM.  MUSCULOSKELETAL: Normal range of motion.  She has 2+ leg edema and no tenderness. Mild kyphosis, no tenderness.  LYMPH NODES: Has no cervical, supraclavicular, axillary and groin adenopathy.   NEUROLOGICAL: Alert and oriented to person, place, and time. No cranial nerve deficit.  Normal muscle tone. Coordination normal.   GENITOURINARY: Deferred exam.  SKIN: Skin is warm and dry. No rash noted. No erythema. No pallor.   EXTREMITIES: No cyanosis, no clubbing, has 2+ leg edema. No Deformity.  PSYCHIATRIC: Normal mood, affect and behavior.  Recall is impaired      Lab Results     GGT 1617  HG 9.5  ; K4.1 CREAT 0.5        Imaging Results     PERTINENT IMAGING RESULTS     CT Abdomen/Pelvis w (8/28/2019):   1. Cholelithiasis with hydropic gallbladder, intra and extrahepatic biliary dilatation as well as numerous common duct stones. This is present on the prior examination as well.   2. Evidence of 3rd spacing with anasarca, small amount of ascites as well as trace right pleural effusion.   3. Nonobstructing nephrolithiasis.     PROCEDURES PERFORMED DURING THIS ADMISSION     ERCP 8/30  Findings:  The scope was passed under direct  vision through the upper GI tract. The   entire examined stomach was normal. The examined duodenum was normal. ,   The major papilla was adjacent to a diverticulum. The major papilla was   normal. The bile duct was deeply cannulated with the short-nosed   traction sphincterotome and guidewire. Contrast was injected. I   personally interpreted the bile duct images. Ductal flow of contrast was   adequate. Image quality was excellent. Contrast extended to the hepatic   ducts. The main bile duct contained multiple stones, the largest of   which was 12 mm in diameter. Biliary sphincterotomy was made with a   traction (standard) sphincterotome using ERBE electrocautery. There was   no post-sphincterotomy bleeding. The biliary tree was swept with a 15 mm   balloon starting at the bifurcation. Many stones were removed. No stones   remained.    Impressions/Post-Op Diagnosis:  - Choledocholithiasis was found. Complete removal was accomplished by   biliary sphincterotomy and balloon extraction.    Recommendation:  - Watch for pancreatitis, bleeding, perforation, and cholangitis.         Procedure(s) findings:   -floppy transverse colon extending to the LUQ   -normal stomach; ERCP performed (see their note for details) - several large stones extracted  -unfortunately, unable to obtain working room in abdominal cavity after ERCP despite large amount of paralytic and sedative her intra-abdominal pressures remained too high too obtain adequate insufflation   -converted to open; gastrotomy closed with staples   -gallbladder dilated with thin wall but not inflamed; some gross spillage of bile   -liver bed with oozing blood  -19 Fr drain placed in gallbladder fossa     Post Discharge Medication Reconciliation Status: discharge medications reconciled and changed, per note/orders (see AVS)          KAMILLA Lacy

## 2021-06-20 NOTE — LETTER
Letter by Sissy Mayer RN at      Author: Sissy Mayer RN Service: -- Author Type: --    Filed:  Encounter Date: 5/9/2020 Status: (Other)       5/9/2020        Ne Monzon  [address]  [facility] [address]    This letter provides a written record that you were tested for COVID-19 on 5/7/20.     Your result was positive.     This means that we found the virus that causes COVID-19 in your sample.    How can I protect others?    For safety, its very important to follow these rules.    First, stay home and away from others (self-isolate) until:      Youve had no fever--and no medicine that reduces fever--for 3 full days (72 hours). And?     Your other symptoms have gotten better. For example, your cough or breathing has improved. And?    At least 10 days have passed since your symptoms started.    During this time:    Dont go to work, school or anywhere else.     Stay away from others in your home. No hugging, kissing or shaking hands.    Dont let anyone visit.    Cover your mouth and nose with a mask, tissue or wash cloth to avoid spreading germs.    Wash your hands and face often with soap and water.    You should not go back to work until you meet the guidelines above for ending your home isolation. You should meet these along with any other guidelines that your employer has.    Employers: This document serves as formal notice of your employees medical guidelines for going back to work. They must meet the above guidelines before going back to work in person.    How can I take care of myself?    1. Take Tylenol (acetaminophen) for fever or pain. If you have liver or kidney problems, ask your family doctor if its okay to take Tylenol.     Take either:     650 mg (two 325 mg pills) every 4 to 6 hours, or?    1,000 mg (two 500 mg pills) every 8 hours as needed.     Note: Dont take more than 3,000 mg in one day.    2. If you have other health problems (like cancer, heart failure, an organ  transplant or severe kidney disease): Call your specialty clinic if you dont feel better in the next 2 days.    3. Know when to call 911: If your breathing is so bad that it keeps you from doing normal activities, call 911 or go to the emergency room. Tell them that youve been staying home and may have COVID-19.    4. Sign up for [employer]. We know its scary to hear that you have COVID-19. We want to track your symptoms to make sure youre okay over the next 2 weeks. Please look for an email from [employer]--this is a free, online program that well use to keep in touch. To sign up, follow the link in the email. Learn more at [email or web address].    5. Interested is participating in research? Visit the link below to view current clinical trials that apply to your situation:  [email or web address]    Where can I get more information?    To learn the [facility] guidelines for staying home, please visit the [facility] website at [email or web address].    To learn more about COVID-19 and how to care for yourself at home, please visit the CDC website at https://www.cdc.gov/coronavirus/2019-ncov/about/steps-when-sick.html.    For more options for care at [facility], please visit our website at [email or web address]

## 2021-06-28 NOTE — PROGRESS NOTES
Progress Notes by Lizeth Bui MBBS at 9/18/2019 10:47 AM     Author: Lizeth Bui MBBS Service: -- Author Type: Physician    Filed: 9/18/2019  3:26 PM Encounter Date: 9/18/2019 Status: Signed    : Lizeth Bui MBBS (Physician)         [facility] Admission note      Patient: Ne Monzon  MRN: [id number]  Date of Service: 9/18/2019      [facility] [[id number]]  Reason for Visit     Chief Complaint   Patient presents with   ? Review Of Multiple Medical Conditions       Code Status     Full code    Assessment     -History of choledocholithiasis with imaging showing cholelithiasis with hydropic gallbladder; intra-as well as extrahepatic biliary dilatation and numerous CBD stone   -status post laparoscopic converted to open cholecystectomy on 8/30/2019  -Status post laparoscopic surgically assisted ERCP on 8/30/2019  -Prolonged postoperative episode of respiratory failure requiring intubation with hypotension  -Acute blood loss anemia with hemoglobin dropped to 6.9 requiring 1 unit of packed RBCs.  She was also given FFP's and vitamin K for elevated INR  -Posada placement secondary to urinary retention  -Status post removal of SHEILA drain on 9/9/2019  -Baseline cognitive impairment with limited recall noted on exam today  -History of epilepsy currently on Keppra with no breakthrough seizures reported  -History of gastric bypass secondary to obesity  -History of DVT/PE on chronic anticoagulation  Profound debilitation-    Plan     Patient is currently admitted to the TCU  Patient was examined in her care plan was asked reviewed with her and her .  Her recall of recent events is limited.  Cognitively remains impaired with a slums of 1930.   is aware of her deficits overall mood and behavior has been stable.  She does have a history of hypertension and remains on losartan and metoprolol.  Recently due to low blood pressures metoprolol was reduced to 50 mg once a day.  She  has hold parameters given today and yesterday her blood pressures have been been consistently low.  Plan is to reduce losartan to 50 mg daily and monitor closely.  Due to supratherapeutic B12 levels will discontinue her B12 supplementation.  Start her on vitamin D supplementation due to low levels at 2000 units daily  Her TSH is slightly elevated at 8.8 but her free T4 is still within normal limits  Advised patient has been that she may need a repeat thyroid panel check done in a few weeks she is at risk of getting hypothyroid      History     Patient is a very pleasant 74 y.o. female who is admitted to TCU  She has a known history of choledocholithiasis with biliary duct dilatation.  She was asymptomatic but presented to the hospital with postprandial right upper quadrant abdominal pain with chills and nausea.  She was admitted with acute cholecystitis.  CT showed cholelithiasis with hydropic gallbladder with both intra-and extrahepatic biliary dilatation and numerous CBD stones.  She underwent cholecystectomy /ERCP on 8/30/2019.  Initially a laparoscopic procedure was attempted but was unsuccessful and she had an open procedure.  She is currently doing well incision was examined and is healing well.  She has no pain  Discontinued  Has some cognitive impairments.  Her  is aware of those her slums was 19/30 CPT is still pending.  Physically she is doing well she is ambulating over 150 feet and feels her strength is returning quite well.   is quite concerned about her leg swelling he has noticed that they put Ace wraps on his legs.  He is wondering if he could put Tubigrip's or other compression hose that he feels this is not effective for her.  She is having frequent low blood pressures her metoprolol dosage was reduced to 50 mg daily in spite of that her blood pressure was only 103 systolic.  She is being monitored closely for blood pressures twice a day    Past Medical History       Past Medical  History:   Diagnosis Date   ? Acute deep vein thrombosis (DVT) of femoral vein of right lower extremity (H)    ? Anemia    ? Benign essential HTN    ? Bilateral pulmonary embolism (H)    ? BPV (benign positional vertigo), bilateral    ? Breast cancer (H)    ? Cataracts, bilateral    ? Choledocholithiasis with cholecystitis    ? Depression    ? Epilepsy, unspecified, intractable, without status epilepticus (H)    ? Estrogen receptor negative    ? Fx wrist    ? Gastric bypass status for obesity    ? GIST (gastrointestinal stromal tumor), non-malignant    ? HTN (hypertension)    ? Hyponatremia    ? Malignant neoplasm of breast (female) (H)    ? Obesity    ? Osteopenia    ? S/p bilateral carpal tunnel release    ? Seizures (H)    ? Systolic murmur    ? Unspecified disorder of ear, unspecified ear        Past Social History     Reviewed, and she  reports that she quit smoking about 54 years ago. She has never used smokeless tobacco. She reports that she drank about 2.4 oz of alcohol per week.    Family History     Reviewed, and includes a history of kidney cancer in her mother  Her mother also had hypertension and diabetes  Sister has COPD.  There is history of breast cancer in her maternal grandmother as well as in her sister    Medication List        Medication List           Accurate as of 9/18/19 10:47 AM. If you have any questions, ask your nurse or doctor.               CONTINUE taking these medications    acetaminophen 500 MG tablet  Commonly known as:  TYLENOL     apixaban 5 mg Tab tablet  Commonly known as:  ELIQUIS     cyanocobalamin (vitamin B-12) 2,000 mcg Tab     docusate sodium 100 MG capsule  Commonly known as:  COLACE     levETIRAcetam 500 MG tablet  Commonly known as:  KEPPRA     losartan 100 MG tablet  Commonly known as:  COZAAR     magnesium chloride 64 mg Tbec delayed-release tablet  Commonly known as:  SLOW-MAG     metoprolol succinate 50 MG 24 hr tablet  Commonly known as:  TOPROL-XL     potassium  chloride 10 MEQ CR tablet  Commonly known as:  KLOR-CON     venlafaxine 150 MG 24 hr capsule  Commonly known as:  EFFEXOR-XR            Allergies     No Known Allergies    Review of Systems   A comprehensive review of 14 systems was done. Pertinent findings noted here and in history of present illness. All the rest negative.  Constitutional: Negative.  Negative for fever, chills, she has activity change, appetite change and fatigue.   HENT: Negative for congestion and facial swelling.    Eyes: Negative for photophobia, redness and visual disturbance.   Respiratory: Negative for cough and chest tightness.    Cardiovascular: Negative for chest pain, palpitations and has chronic leg swelling.   Gastrointestinal: Negative for nausea, diarrhea, constipation, blood in stool and abdominal distention.   Genitourinary: Negative.    Musculoskeletal: Negative.  Very weak and difficulty walking   Skin: Negative.    Neurological: Negative for dizziness, tremors, syncope, weakness, light-headedness and headaches.   Hematological: Does not bruise/bleed easily.   Psychiatric/Behavioral: Negative.  Recall is impaired       Physical Exam     Blood pressure 111/79 temp 98 pulse 82 no current weights are available for review    Constitutional: Oriented to person, place, and time and appears well-developed.   HEENT:  Normocephalic and atraumatic.  Eyes: Conjunctivae and EOM are normal. Pupils are equal, round, and reactive to light. No discharge.  No scleral icterus. Nose normal. Mouth/Throat: Oropharynx is clear and moist. No oropharyngeal exudate.    NECK: Normal range of motion. Neck supple. No JVD present. No tracheal deviation present. No thyromegaly present.   CARDIOVASCULAR: Normal rate, regular rhythm and intact distal pulses.  Exam reveals no gallop and no friction rub.  Systolic murmur present.  PULMONARY: Effort normal and breath sounds normal. No respiratory distress.No Wheezing or rales.  ABDOMEN: Soft. Bowel sounds are  normal. No distension and no mass.  There is no tenderness. There is no rebound and no guarding. No HSM.  MUSCULOSKELETAL: Normal range of motion.  She has 2+ leg edema and no tenderness. Mild kyphosis, no tenderness.  LYMPH NODES: Has no cervical, supraclavicular, axillary and groin adenopathy.   NEUROLOGICAL: Alert and oriented to person, place, and time. No cranial nerve deficit.  Normal muscle tone. Coordination normal.   GENITOURINARY: Deferred exam.  SKIN: Skin is warm and dry. No rash noted. No erythema. No pallor.   EXTREMITIES: No cyanosis, no clubbing, has 2+ leg edema. No Deformity.  PSYCHIATRIC: Normal mood, affect and behavior.  Recall is impaired      Lab Results     GGT 1617  HG 9.5  ; K4.1 CREAT 0.5        Post Discharge Medication Reconciliation Status: discharge medications reconciled and changed, per note/orders (see AVS)          KAMILLA Lacy

## 2021-06-28 NOTE — PROGRESS NOTES
Progress Notes by Octavio Vanegas NP at 9/25/2019 12:15 PM     Author: Octavio Vanegas NP Service: -- Author Type: Nurse Practitioner    Filed: 9/25/2019 12:34 PM Encounter Date: 9/25/2019 Status: Attested    : Octavio Vanegas NP (Nurse Practitioner) Cosigner: Lizeth Bui MBBS at 9/30/2019 11:36 AM    Attestation signed by Lizeth Bui MBBS at 9/30/2019 11:36 AM    Agree with [facility]      Facility:    [facility] [[id number]]  Code Status: POLST AVAILABLE      Chief Complaint/Reason for Visit:   Chief Complaint   Patient presents with   ? Discharge Summary       HPI:   Ne is a 74 y.o. female who is a recent transfer from [facility] with admission on 8/28/2019 and discharged on 9/10/2019.  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  She was previously hospitalized at [facility] from 2/26/2019 to 3/1/2019 with extensive right lower extremity DVT involving IVC, right iliac femoral and distal veins.  She was then discharged on apixaban.  On 3/11/2019 underwent venogram thrombolytic and thrombectomy followed by venoplasty and IVC filter retrieval.  At that time she was diagnosed with a PE was choledocholithiasis causing bile duct dilation of 1.2cm.  GI attempted ERCP on 2/27/1990 with stent placement but was unsuccessful as the ampulla could not be cannulated.  It was then recommended that she have a laparoscopic assisted ERCP with lap amanda when safe to do so.  She did see Dr. Wick in clinic on 8/7/2019 plan was to hold on surgery as she was asymptomatic.  The night prior to admission developed post prandial right upper quadrant abdominal pain associate with fever and chills which prompted her ED presentation.   She was afebrile and had normal vitals, leukocytosis with a white count of 15 point 8T bili of 2.5D bili 1.9 ALP 1154, imaging showed cholecystitis with hypertrophic gallbladder intra-and extrahepatic biliary dilatation as well as numerous common bile duct stones.  Was started on Zosyn and surgery was consulted.  Taken to the OR on 8/11/2019 for combined ERCP and cholecystectomy.  Laparoscopic removal was attempted though converted to open procedure due to failing of insufflation due to high intra-abdominal pressure and maximum dose of paralytics.  Gallbladder was distended though not particularly inflamed.  There was diffuse oozing from the gallbladder fossa, which was felt to be related to apixaban so a drain was placed.  Despite neuromuscular blockade reversal she was intubated overnight postoperatively and required Adan-Synephrine with extubation the following day.  Also given 1 unit packed red blood cell for hemoglobin of 6.9 and FFP vitamin K for INR reversal with an INR of 2.6 on 9/1/2019.  She did have her drain/Posada removed prior to discharge.  She was discharged to TCU for rehab.    She has concluded her TCU stay and will be discharged home on 9/27/2019 with services.    Past Medical History:  Past Medical History:   Diagnosis Date   ? Acute deep vein thrombosis (DVT) of femoral vein of right lower extremity (H)    ? Anemia    ? Benign essential HTN    ? Bilateral pulmonary embolism (H)    ? BPV (benign positional vertigo), bilateral    ? Breast cancer (H)    ? Cataracts, bilateral    ? Choledocholithiasis with cholecystitis    ? Depression    ? Epilepsy, unspecified, intractable, without status epilepticus (H)    ? Estrogen receptor negative    ? Fx wrist    ? Gastric bypass status for obesity    ? GIST (gastrointestinal stromal tumor), non-malignant    ? HTN (hypertension)    ? Hyponatremia    ? Malignant neoplasm of breast (female) (H)    ? Obesity    ? Osteopenia    ? S/p bilateral carpal tunnel  release    ? Seizures (H)    ? Systolic murmur    ? Unspecified disorder of ear, unspecified ear            Surgical History:  Past Surgical History:   Procedure Laterality Date   ? APPENDECTOMY     ? BREAST LUMPECTOMY     ? CARPAL TUNNEL RELEASE Left 2015   ? CHOLECYSTECTOMY OPEN  2019   ? ERCP  2019   ? ERCP  2019   ? GASTRIC BYPASS     ? HEMIARTHROPLASTY HIP Right 2010   ? HYSTERECTOMY     ? INNER EAR SURGERY     ? IR IVC FILTER PLACEMENT Right 2019   ? REVISION TOTAL HIP ARTHROPLASTY Right 2011   ? TOTAL HIP ARTHROPLASTY Left 2011       Family History:   Family History   Problem Relation Age of Onset   ? Diabetes Mother    ? Hypertension Mother    ? Kidney cancer Mother    ? No Medical Problems Father    ? COPD Sister    ? Breast cancer Maternal Grandmother    ? No Medical Problems Paternal Grandmother    ? No Medical Problems Paternal Grandfather    ? Cancer Sister        Social History:    Social History     Socioeconomic History   ? Marital status:      Spouse name: Not on file   ? Number of children: Not on file   ? Years of education: Not on file   ? Highest education level: Not on file   Occupational History   ? Not on file   Social Needs   ? Financial resource strain: Not on file   ? Food insecurity:     Worry: Not on file     Inability: Not on file   ? Transportation needs:     Medical: Not on file     Non-medical: Not on file   Tobacco Use   ? Smoking status: Former Smoker     Last attempt to quit: 1965     Years since quittin.7   ? Smokeless tobacco: Never Used   Substance and Sexual Activity   ? Alcohol use: Not Currently     Alcohol/week: 4.0 standard drinks     Types: 4 Glasses of wine per week   ? Drug use: Not on file   ? Sexual activity: Not on file   Lifestyle   ? Physical activity:     Days per week: Not on file     Minutes per session: Not on file   ? Stress: Not on file   Relationships   ? Social connections:     Talks on  phone: Not on file     Gets together: Not on file     Attends Mandaen service: Not on file     Active member of club or organization: Not on file     Attends meetings of clubs or organizations: Not on file     Relationship status: Not on file   ? Intimate partner violence:     Fear of current or ex partner: Not on file     Emotionally abused: Not on file     Physically abused: Not on file     Forced sexual activity: Not on file   Other Topics Concern   ? Not on file   Social History Narrative   ? Not on file          Review of Systems   Constitutional: Positive for activity change and fatigue. Negative for appetite change, chills, diaphoresis and fever.        Denies concerns   HENT: Negative for congestion and hearing loss.    Eyes: Negative.    Respiratory: Negative for shortness of breath and wheezing.    Cardiovascular: Positive for leg swelling.        Lymphedema   Gastrointestinal: Negative for abdominal distention, abdominal pain, constipation, diarrhea and nausea.   Endocrine: Negative.    Genitourinary: Negative for difficulty urinating.   Musculoskeletal:        Denies pain   Skin: Positive for wound.        Abdominal incision   Allergic/Immunologic: Negative.    Neurological: Negative for dizziness, tremors, speech difficulty and light-headedness.   Hematological: Negative.    Psychiatric/Behavioral: Negative for agitation, confusion, hallucinations and sleep disturbance. The patient is not nervous/anxious.        Vitals:    09/25/19 1220   BP: 125/86   Pulse: 97   Resp: 16   Temp: 98  F (36.7  C)   SpO2: 97%   Weight: 128 lb (58.1 kg)       Physical Exam   Constitutional: No distress.   No acute issues   HENT:   Head: Normocephalic and atraumatic.   Mouth/Throat: Oropharynx is clear and moist. No oropharyngeal exudate.   Eyes: Right eye exhibits no discharge. Left eye exhibits no discharge. No scleral icterus.   Neck: Normal range of motion. Neck supple. No JVD present.   Cardiovascular: Normal rate.  Exam reveals no gallop and no friction rub.   No murmur heard.  Pulmonary/Chest: Effort normal. No stridor. No respiratory distress. She has no wheezes. She has no rales.   Dim, RA   Abdominal: Soft. Bowel sounds are normal. She exhibits no distension. There is no tenderness. There is no rebound.   Denies constipation or diarrhea, small fluid pocket in LUQ, stable   Genitourinary:    Genitourinary Comments: deferred   Musculoskeletal:         General: Edema present.      Comments: Lymphedema present, using wraps on RLE     Neurological: She is alert.   A/O x2, recall issues   Skin: Skin is warm and dry. She is not diaphoretic. No erythema.   Abdominal incision, staples and sutures removed, steri strips intact   Psychiatric: She has a normal mood and affect.   Denies anxiety or depression   Vitals reviewed.      Medication List:  Current Outpatient Medications   Medication Sig   ? acetaminophen (TYLENOL) 500 MG tablet Take 500 mg by mouth every 4 (four) hours as needed for pain.   ? apixaban (ELIQUIS) 5 mg Tab tablet Take 5 mg by mouth 2 (two) times a day.   ? cholecalciferol, vitamin D3, 1,000 unit (25 mcg) tablet Take 2,000 Units by mouth daily.   ? docusate sodium (COLACE) 100 MG capsule Take 100 mg by mouth 2 (two) times a day as needed for constipation.   ? levETIRAcetam (KEPPRA) 500 MG tablet Take 500 mg by mouth 3 (three) times a day.          ? losartan (COZAAR) 100 MG tablet Take 50 mg by mouth daily. Hold for SBP <110 or DBP <60         ? magnesium chloride (SLOW-MAG) 64 mg TbEC delayed-release tablet Take 64 mg by mouth 3 (three) times a day.   ? magnesium oxide (MAG-OX) 400 mg (241.3 mg magnesium) tablet Take 400 mg by mouth daily.   ? metoprolol succinate (TOPROL-XL) 50 MG 24 hr tablet Take 50 mg by mouth daily.          ? potassium chloride (KLOR-CON) 10 MEQ CR tablet Take 10 mEq by mouth 3 (three) times a day.          ? venlafaxine (EFFEXOR-XR) 150 MG 24 hr capsule Take 150 mg by mouth daily.        Labs:  Results for orders placed or performed in visit on 09/20/19   Basic Metabolic Panel   Result Value Ref Range    Sodium 137 136 - 145 mmol/L    Potassium 4.0 3.5 - 5.0 mmol/L    Chloride 105 98 - 107 mmol/L    CO2 25 22 - 31 mmol/L    Anion Gap, Calculation 7 5 - 18 mmol/L    Glucose 52 (LL) 70 - 125 mg/dL    Calcium 8.3 (L) 8.5 - 10.5 mg/dL    BUN 7 (L) 8 - 28 mg/dL    Creatinine 0.61 0.60 - 1.10 mg/dL    GFR MDRD Af Amer >60 >60 mL/min/1.73m2    GFR MDRD Non Af Amer >60 >60 mL/min/1.73m2     Lab Results   Component Value Date    WBC 6.8 09/13/2019    HGB 9.6 (L) 09/13/2019    HCT 29.7 (L) 09/13/2019    MCV 90 09/13/2019     09/13/2019     Lab Results   Component Value Date    TSH 8.80 (H) 09/17/2019       Vitamin D, Total (25-Hydroxy)   Date Value Ref Range Status   09/17/2019 24.2 (L) 30.0 - 80.0 ng/mL Final       Lab Results   Component Value Date    [id number] >2,000 (H) 09/17/2019         Assessment/Plan:    ERCP and cholecystectomy per open procedure: continue incisional cares, staples and suture removed, Steri-Strips intact, ordered to keep intact until 9/30, no f/u ordered    Poor nutrition: Prealbumin 10.1, PO intake improved    Pain control: Continue Tylenol 500 mg every 4 hours as needed, uses periodically    DVT prophylaxis: Continue apixaban 5 mg twice daily    Vitamin B12 deficiency: cyanocobalamin discontinued    Vit D def: started on D3 2000U daily    Hypokalemia: Increased potassium to 10 mEq 3 times daily, last K 4.0 on 9/20/19    Hypomagnesia: Increased to Slow-Mag 64 mg 3 times daily with mag oxide 400mg daily, last 1.7 on 9/20/19. Recheck with PCP    Seizure prophylaxis: Continue Keppra 500 mg 3 times daily    Subclinical hypothyroidism: Last TSH 8.8 with free T4 0.8    Hypertension: decreased losartan 50 mg daily (held for SBP <110 and DBP <60) and decreased metoprolol tartrate to 50 mg daily, SBP <140    Constipation: Continue docusate 100 mg twice daily as  needed    Depression: Continue venlafaxine 100 mg daily    Disposition: Plans to return to home with services on 9/27/2019. SLUMS 19/30 and CPT 4.6    MEDICAL EQUIPMENT NEEDS:  na    DISCHARGE PLAN/FACE TO FACE:  I certify that services are/were furnished while this patient was under the care of a physician and that a physician or an allowed non-physician practitioner (NPP), had a face-to-face encounter that meets the physician face-to-face encounter requirements. The encounter was in whole, or in part, related to the primary reason for home health. The patient is confined to his/her home and needs intermittent skilled nursing, physical therapy, speech-language pathology, or the continued need for occupational therapy. A plan of care has been established by a physician and is periodically reviewed by a physician.  Date of Face-to-Face Encounter: 9/25/2019    I certify that, based on my findings, the following services are medically necessary home health services: HHC HHA and PT/OT to evaluate treat at home.    My clinical findings support the need for the above skilled services because: patient will be discharging to home.  Patient is assistance with performing IADLs and ADLs effectively and safely at home.    Patient to re-establish plan of care with their PCP within 7 days after leaving TCU.     The care plan has been reviewed and all orders signed. Changes to care plan, if any, as noted. Otherwise, continue care plan of care.  The total time spent with this patient was 31 minutes, with greater than 50% spent in counseling and coordination of care that included multiple issues per keeping Steri-Strips intact until 9/30, improving p.o. intake and continuing therapy at home which lasted 16 minutes.      Electronically signed by: Octavio Vanegas NP

## 2021-06-28 NOTE — PROGRESS NOTES
Progress Notes by Lizeth Bui MBBS at 9/11/2019  8:41 AM     Author: Lizeth Bui MBBS Service: -- Author Type: Physician    Filed: 9/11/2019  9:27 PM Encounter Date: 9/11/2019 Status: Signed    : Lizeth Bui MBBS (Physician)         [facility] Admission note      Patient: Ne Monzon  MRN: [id number]  Date of Service: 9/11/2019      [facility] [[id number]]  Reason for Visit     Chief Complaint   Patient presents with   ? H & P       Code Status     Full code    Assessment     -History of choledocholithiasis with imaging showing cholelithiasis with hydropic gallbladder; intra-as well as extrahepatic biliary dilatation and numerous CBD stone   -status post laparoscopic converted to open cholecystectomy on 8/30/2019  -Status post laparoscopic surgically assisted ERCP on 8/30/2019  -Prolonged postoperative episode of respiratory failure requiring intubation with hypotension  -Acute blood loss anemia with hemoglobin dropped to 6.9 requiring 1 unit of packed RBCs.  She was also given FFP's and vitamin K for elevated INR  -Posada placement secondary to urinary retention  -Status post removal of SHEILA drain on 9/9/2019  -Baseline cognitive impairment with limited recall noted on exam today  -History of epilepsy currently on Keppra with no breakthrough seizures reported  -History of gastric bypass secondary to obesity  -History of DVT/PE on chronic anticoagulation  Profound debilitation-    Plan     Patient is currently admitted to the TCU  Patient was examined in her care plan was asked reviewed with her and her .  Her recall of recent events is limited.   indicated that at baseline she has some deficits with those.  She has been having some ongoing cognitive decline but he feels she is still not back to baseline.  Her surgical incision is intact with staples and they have a follow-up appointment with the surgeon.  Pain management reviewed with both of them and she has  some Tylenol and oxycodone she is not reporting much pain though.  She is tolerating a regular diet with no concerns.   is making sure she gets a good diet and.  We did talk about malnutrition concerns and risk of ascites.  If he or she notices any edema they will let me know.  She does have significant lymphedema.  We will recheck labs closely she had anemia with effusion given which she was not aware of.  She has continued on her apixaban with underlying history of DVT and PE  Monitor blood pressures closely she is on both metoprolol and high doses of losartan.  So her blood pressures are low suspect due to poor intake.  If they continue to be low she may need to have medications being held  Total time spent is 45 minutes with more than 30 minutes spent face-to-face talking to the patient in the presence of her .  Care concerns including cognitive status and confusion issues were reviewed with both patient and .   is aware and feels she is still not back to baseline.  Also reviewed with pain management concerns and risk of malnutrition and ascites  Pain management also reviewed with both of them    History     Patient is a very pleasant 74 y.o. female who is admitted to TCU  She has a known history of choledocholithiasis with biliary duct dilatation.  She was asymptomatic but presented to the hospital with postprandial right upper quadrant abdominal pain with chills and nausea.  She was admitted with acute cholecystitis.  CT showed cholelithiasis with hydropic gallbladder with both intra-and extrahepatic biliary dilatation and numerous CBD stones.  She underwent cholecystectomy /ERCP on 8/30/2019.  Initially a laparoscopic procedure was attempted but was unsuccessful and she had an open procedure.  Postprocedure she had prolonged respiratory failure requiring prolonged ICU stay and intubation.  She was also significantly hypotensive but eventually was able to wean  She became anemic  hemoglobin dropped down to 6.9 and she received 1 unit of packed RBC blood transfusion.  She was also noted to have an elevated INR of 2.6 and given vitamin K as well as fresh frozen plasma  Eventually her SHEILA drain was removed on 9 9 followed by general surgery who felt she was safe to discharge.  She remains at risk for ascites due to hypoalbuminemia.  She may require Lasix as per her surgeon  Due to low magnesium she remains on supplementation  She remains somewhat confused and has limited recall of recent events.   present at bedside did update me that these are baseline and he is aware of those deficits    Past Medical History       Past Medical History:   Diagnosis Date   ? Acute deep vein thrombosis (DVT) of femoral vein of right lower extremity (H)    ? Anemia    ? Benign essential HTN    ? Bilateral pulmonary embolism (H)    ? BPV (benign positional vertigo), bilateral    ? Breast cancer (H)    ? Cataracts, bilateral    ? Choledocholithiasis with cholecystitis    ? Depression    ? Epilepsy, unspecified, intractable, without status epilepticus (H)    ? Estrogen receptor negative    ? Fx wrist    ? Gastric bypass status for obesity    ? GIST (gastrointestinal stromal tumor), non-malignant    ? HTN (hypertension)    ? Hyponatremia    ? Malignant neoplasm of breast (female) (H)    ? Obesity    ? Osteopenia    ? S/p bilateral carpal tunnel release    ? Seizures (H)    ? Systolic murmur    ? Unspecified disorder of ear, unspecified ear        Past Social History     Reviewed, and she  reports that she quit smoking about 54 years ago. She has never used smokeless tobacco. She reports that she drank about 2.4 oz of alcohol per week.    Family History     Reviewed, and includes a history of kidney cancer in her mother  Her mother also had hypertension and diabetes  Sister has COPD.  There is history of breast cancer in her maternal grandmother as well as in her sister    Medication List        Medication List            Accurate as of 9/11/19  9:25 PM. If you have any questions, ask your nurse or doctor.               CONTINUE taking these medications    acetaminophen 500 MG tablet  Commonly known as:  TYLENOL     apixaban 5 mg Tab tablet  Commonly known as:  ELIQUIS     cyanocobalamin (vitamin B-12) 2,000 mcg Tab     docusate sodium 100 MG capsule  Commonly known as:  COLACE     levETIRAcetam 500 MG tablet  Commonly known as:  KEPPRA     losartan 100 MG tablet  Commonly known as:  COZAAR     metoprolol succinate 50 MG 24 hr tablet  Commonly known as:  TOPROL-XL     MULTIPLE VITAMIN ORAL     oxyCODONE 5 MG immediate release tablet  Commonly known as:  ROXICODONE     potassium chloride 10 MEQ CR tablet  Commonly known as:  KLOR-CON     venlafaxine 150 MG 24 hr capsule  Commonly known as:  EFFEXOR-XR            Allergies     No Known Allergies    Review of Systems   A comprehensive review of 14 systems was done. Pertinent findings noted here and in history of present illness. All the rest negative.  Constitutional: Negative.  Negative for fever, chills, she has activity change, appetite change and fatigue.   HENT: Negative for congestion and facial swelling.    Eyes: Negative for photophobia, redness and visual disturbance.   Respiratory: Negative for cough and chest tightness.    Cardiovascular: Negative for chest pain, palpitations and has chronic leg swelling.   Gastrointestinal: Negative for nausea, diarrhea, constipation, blood in stool and abdominal distention.   Genitourinary: Negative.    Musculoskeletal: Negative.  Very weak and difficulty walking   Skin: Negative.    Neurological: Negative for dizziness, tremors, syncope, weakness, light-headedness and headaches.   Hematological: Does not bruise/bleed easily.   Psychiatric/Behavioral: Negative.  Recall is impaired       Physical Exam     Blood pressure 111/79 temp 98 pulse 82 no current weights are available for review    Constitutional: Oriented to person, place,  and time and appears well-developed.   HEENT:  Normocephalic and atraumatic.  Eyes: Conjunctivae and EOM are normal. Pupils are equal, round, and reactive to light. No discharge.  No scleral icterus. Nose normal. Mouth/Throat: Oropharynx is clear and moist. No oropharyngeal exudate.    NECK: Normal range of motion. Neck supple. No JVD present. No tracheal deviation present. No thyromegaly present.   CARDIOVASCULAR: Normal rate, regular rhythm and intact distal pulses.  Exam reveals no gallop and no friction rub.  Systolic murmur present.  PULMONARY: Effort normal and breath sounds normal. No respiratory distress.No Wheezing or rales.  ABDOMEN: Soft. Bowel sounds are normal. No distension and no mass.  There is no tenderness. There is no rebound and no guarding. No HSM.  MUSCULOSKELETAL: Normal range of motion.  She has 2+ leg edema and no tenderness. Mild kyphosis, no tenderness.  LYMPH NODES: Has no cervical, supraclavicular, axillary and groin adenopathy.   NEUROLOGICAL: Alert and oriented to person, place, and time. No cranial nerve deficit.  Normal muscle tone. Coordination normal.   GENITOURINARY: Deferred exam.  SKIN: Skin is warm and dry. No rash noted. No erythema. No pallor.   EXTREMITIES: No cyanosis, no clubbing, has 2+ leg edema. No Deformity.  PSYCHIATRIC: Normal mood, affect and behavior.  Recall is impaired      Lab Results     GGT 1617  HG 9.5  ; K4.1 CREAT 0.5        Imaging Results     PERTINENT IMAGING RESULTS     CT Abdomen/Pelvis w (8/28/2019):   1. Cholelithiasis with hydropic gallbladder, intra and extrahepatic biliary dilatation as well as numerous common duct stones. This is present on the prior examination as well.   2. Evidence of 3rd spacing with anasarca, small amount of ascites as well as trace right pleural effusion.   3. Nonobstructing nephrolithiasis.     PROCEDURES PERFORMED DURING THIS ADMISSION     ERCP 8/30  Findings:  The scope was passed under direct vision through the  upper GI tract. The   entire examined stomach was normal. The examined duodenum was normal. ,   The major papilla was adjacent to a diverticulum. The major papilla was   normal. The bile duct was deeply cannulated with the short-nosed   traction sphincterotome and guidewire. Contrast was injected. I   personally interpreted the bile duct images. Ductal flow of contrast was   adequate. Image quality was excellent. Contrast extended to the hepatic   ducts. The main bile duct contained multiple stones, the largest of   which was 12 mm in diameter. Biliary sphincterotomy was made with a   traction (standard) sphincterotome using ERBE electrocautery. There was   no post-sphincterotomy bleeding. The biliary tree was swept with a 15 mm   balloon starting at the bifurcation. Many stones were removed. No stones   remained.    Impressions/Post-Op Diagnosis:  - Choledocholithiasis was found. Complete removal was accomplished by   biliary sphincterotomy and balloon extraction.    Recommendation:  - Watch for pancreatitis, bleeding, perforation, and cholangitis.         Procedure(s) findings:   -floppy transverse colon extending to the LUQ   -normal stomach; ERCP performed (see their note for details) - several large stones extracted  -unfortunately, unable to obtain working room in abdominal cavity after ERCP despite large amount of paralytic and sedative her intra-abdominal pressures remained too high too obtain adequate insufflation   -converted to open; gastrotomy closed with staples   -gallbladder dilated with thin wall but not inflamed; some gross spillage of bile   -liver bed with oozing blood  -19 Fr drain placed in gallbladder fossa     Post Discharge Medication Reconciliation Status: discharge medications reconciled and changed, per note/orders (see AVS)          KAMILLA Lacy

## 2025-04-08 ENCOUNTER — TRANSITIONAL CARE UNIT VISIT (OUTPATIENT)
Dept: GERIATRICS | Facility: CLINIC | Age: 80
End: 2025-04-08
Payer: COMMERCIAL

## 2025-04-08 ENCOUNTER — TELEPHONE (OUTPATIENT)
Dept: GERIATRICS | Facility: CLINIC | Age: 80
End: 2025-04-08

## 2025-04-08 ENCOUNTER — DOCUMENTATION ONLY (OUTPATIENT)
Dept: GERIATRICS | Facility: CLINIC | Age: 80
End: 2025-04-08
Payer: COMMERCIAL

## 2025-04-08 ENCOUNTER — LAB REQUISITION (OUTPATIENT)
Dept: LAB | Facility: CLINIC | Age: 80
End: 2025-04-08
Payer: COMMERCIAL

## 2025-04-08 VITALS
SYSTOLIC BLOOD PRESSURE: 110 MMHG | DIASTOLIC BLOOD PRESSURE: 74 MMHG | TEMPERATURE: 96.6 F | OXYGEN SATURATION: 92 % | HEART RATE: 103 BPM | RESPIRATION RATE: 18 BRPM

## 2025-04-08 DIAGNOSIS — G40.909 SEIZURE DISORDER (H): ICD-10-CM

## 2025-04-08 DIAGNOSIS — M97.01XD PERIPROSTHETIC FRACTURE AROUND INTERNAL PROSTHETIC RIGHT HIP JOINT, SUBSEQUENT ENCOUNTER: Primary | ICD-10-CM

## 2025-04-08 DIAGNOSIS — D32.9 MENINGIOMA (H): ICD-10-CM
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
## 2025-04-08 DIAGNOSIS — M62.81 GENERALIZED MUSCLE WEAKNESS: ICD-10-CM

## 2025-04-08 DIAGNOSIS — I48.0 PAROXYSMAL ATRIAL FIBRILLATION (H): ICD-10-CM

## 2025-04-08 DIAGNOSIS — I10 PRIMARY HYPERTENSION: ICD-10-CM

## 2025-04-08 DIAGNOSIS — I10 ESSENTIAL (PRIMARY) HYPERTENSION: ICD-10-CM

## 2025-04-08 PROBLEM — K80.40 CHOLEDOCHOLITHIASIS WITH CHOLECYSTITIS: Status: ACTIVE | Noted: 2019-02-26

## 2025-04-08 PROBLEM — S22.000A THORACIC COMPRESSION FRACTURE (H): Chronic | Status: ACTIVE | Noted: 2025-04-04

## 2025-04-08 PROBLEM — H90.A22 SENSORINEURAL HEARING LOSS (SNHL) OF LEFT EAR WITH RESTRICTED HEARING OF RIGHT EAR: Status: ACTIVE | Noted: 2021-09-16

## 2025-04-08 PROBLEM — F33.41 RECURRENT MAJOR DEPRESSIVE DISORDER, IN PARTIAL REMISSION: Status: ACTIVE | Noted: 2022-06-03

## 2025-04-08 PROBLEM — Z17.1: Status: ACTIVE | Noted: 2019-03-08

## 2025-04-08 PROBLEM — E87.6 HYPOKALEMIA: Status: ACTIVE | Noted: 2019-09-16

## 2025-04-08 PROBLEM — C50.919: Status: ACTIVE | Noted: 2019-03-08

## 2025-04-08 PROBLEM — E16.2 HYPOGLYCEMIA WITHOUT DIAGNOSIS OF DIABETES MELLITUS: Status: ACTIVE | Noted: 2021-08-02

## 2025-04-08 PROBLEM — H90.A31 MIXED CONDUCTIVE AND SENSORINEURAL HEARING LOSS OF RIGHT EAR WITH RESTRICTED HEARING OF LEFT EAR: Status: ACTIVE | Noted: 2021-09-16

## 2025-04-08 PROBLEM — R01.1 SYSTOLIC MURMUR: Status: ACTIVE | Noted: 2018-06-05

## 2025-04-08 PROBLEM — I26.99 BILATERAL PULMONARY EMBOLISM (H): Status: ACTIVE | Noted: 2019-02-26

## 2025-04-08 PROBLEM — Z86.711 HISTORY OF PULMONARY EMBOLUS (PE): Status: ACTIVE | Noted: 2021-08-02

## 2025-04-08 PROBLEM — I82.411 ACUTE DEEP VEIN THROMBOSIS (DVT) OF FEMORAL VEIN OF RIGHT LOWER EXTREMITY (H): Status: ACTIVE | Noted: 2019-02-26

## 2025-04-08 PROBLEM — M97.01XA PERIPROSTHETIC FRACTURE AROUND INTERNAL PROSTHETIC RIGHT HIP JOINT (H): Status: ACTIVE | Noted: 2025-04-02

## 2025-04-08 PROCEDURE — 99310 SBSQ NF CARE HIGH MDM 45: CPT

## 2025-04-08 RX ORDER — AMLODIPINE BESYLATE 2.5 MG/1
2.5 TABLET ORAL DAILY
COMMUNITY

## 2025-04-08 RX ORDER — ALENDRONATE SODIUM 70 MG/1
70 TABLET ORAL
COMMUNITY

## 2025-04-08 RX ORDER — OXYCODONE HYDROCHLORIDE 5 MG/1
5 TABLET ORAL EVERY 6 HOURS PRN
COMMUNITY

## 2025-04-08 NOTE — PROGRESS NOTES
[facility]    PRIMARY CARE PROVIDER AND CLINIC:  Billy Thompson MD, [facility] [address]  / [place] [id number]  Chief Complaint   Patient presents with    Hospital F/U      [place] Medical Record Number:  [id number]  Place of Service where encounter took place:  [facility] ([facility]) [[id number]]    Ne Monzon  is a 80 year old  (1945), admitted to the above facility from  [facility]. Hospital stay 4/2/25 through 4/7/25. PMHx includes paroxysmal atrial fibrillation, depressive disorder history of DVT with PE, hypertension    HPI:    Brief Hospital Summary:   Ne presented to the ED following a fall two days earlier when trying to use the bathroom. Noticed worsened hip pain. Also on Eliquis. Workup in ED shows right periprosthetic hip fracture and patient was admitted with orthopedic consultation. Head CT with hyperdense structure right anterior cranial fossa this is favorable meningioma however with trauma subdural hematoma cannot be excluded and MRI consistent with meningioma. Seen by Orthopedics and they recommended Nonoperative management of periprosthetic hip fracture. CT cervical spine also showed Age-indeterminate superior endplate compression fractures at T1, T2, T3, and T4.  Spine was consulted, MRI showed chronic T-spine fractures along with severe spinal stenosis at C4-5, C5-6, per neurosurgery no need for bracing and they recommended outpatient follow-up for spinal stenosis and meningioma. Patient also required machado for urine retention.    Today Ne is resting comfortably in bed with her  at her bedside. She does not believe she has needed to use the pain medication yet. She would like to have tylenol scheduled. No pain with the catheter. Discussed that we will be removing the cath this week. Sleeping well. Appetite stable. Mood stable. Having regular bowel movements. Denies shortness of breath, chest pain, palpitations, dizziness,  lightheadedness.     CODE STATUS/ADVANCE DIRECTIVES DISCUSSION:  No Order  CPR/Full code   ALLERGIES: No Known Allergies   PAST MEDICAL HISTORY: No past medical history on file.   PAST SURGICAL HISTORY:   has a past surgical history that includes gastric bypass (2003); Inner ear surgery; appendectomy; Hysterectomy; Lumpectomy breast (2009); Total Hip Arthroplasty (Left, 02/14/2011); Hemiarthroplasty Hip (Right, 12/03/2010); Arthroplasty revision hip (Right, 05/16/2011); Release carpal tunnel (Left, 01/29/2015); Endoscopic retrograde cholangiopancreatogram (02/27/2019); Cholecystectomy Open (08/30/2019); Endoscopic retrograde cholangiopancreatogram (08/30/2019); and IR IVC Filter Placement (Right, 03/11/2019).  FAMILY HISTORY: family history includes Breast Cancer in her maternal grandmother; Cancer in her sister; Chronic Obstructive Pulmonary Disease in her sister; Diabetes in her mother; Hypertension in her mother; Kidney Cancer in her mother; No Known Problems in her father, paternal grandfather, and paternal grandmother.  SOCIAL HISTORY:   reports that she quit smoking about 60 years ago. She has never used smokeless tobacco. She reports that she does not currently use alcohol after a past usage of about 4.0 standard drinks of alcohol per week.  Patient's living condition: lives with partner in independently living senior home    Current Outpatient Medications   Medication Sig Dispense Refill    acetaminophen (TYLENOL) 500 MG tablet Take 1,000 mg by mouth 4 times daily.      alendronate (FOSAMAX) 70 MG tablet Take 70 mg by mouth every 7 days.      amLODIPine (NORVASC) 2.5 MG tablet Take 2.5 mg by mouth daily.      apixaban (ELIQUIS) 5 mg Tab tablet [APIXABAN (ELIQUIS) 5 MG TAB TABLET] Take 5 mg by mouth 2 (two) times a day.      carboxymethylcellulose (REFRESH LIQUIGEL) 1 % ophthalmic solution Place 1 drop into both eyes 4 times daily as needed for dry eyes.      Cyanocobalamin 1000 MCG CAPS Take 2,000 mcg by  mouth daily.      levETIRAcetam (KEPPRA) 500 MG tablet [LEVETIRACETAM (KEPPRA) 500 MG TABLET] Take 500 mg by mouth 3 (three) times a day.             losartan (COZAAR) 100 MG tablet Take 100 mg by mouth daily.      magnesium chloride (SLOW-MAG) 64 mg TbEC delayed-release tablet [MAGNESIUM CHLORIDE (SLOW-MAG) 64 MG TBEC DELAYED-RELEASE TABLET] Take 64 mg by mouth 3 (three) times a day.      metoprolol succinate (TOPROL-XL) 50 MG 24 hr tablet [METOPROLOL SUCCINATE (TOPROL-XL) 50 MG 24 HR TABLET] Take 50 mg by mouth daily.             oxyCODONE (ROXICODONE) 5 MG tablet Take 5 mg by mouth every 6 hours as needed for severe pain.      potassium chloride (KLOR-CON) 10 MEQ CR tablet [POTASSIUM CHLORIDE (KLOR-CON) 10 MEQ CR TABLET] Take 10 mEq by mouth 3 (three) times a day.             venlafaxine (EFFEXOR-XR) 150 MG 24 hr capsule [VENLAFAXINE (EFFEXOR-XR) 150 MG 24 HR CAPSULE] Take 150 mg by mouth daily.       No current facility-administered medications for this visit.      ROS:  4 point ROS including Respiratory, CV, GI and , other than that noted in the HPI,  is negative    Vital signs:/74   Pulse 103   Temp (!) 96.6  F (35.9  C)   Resp 18   SpO2 92%    GENERAL APPEARANCE: Well developed, well nourished, in no acute distress.  LUNGS: Lung sounds CTA, no adventitious sounds, respiratory effort normal.  CARD: RRR, S1, S2, without murmurs, gallops, rubs, no JVD, peripheral pulses 2+ and symmetric  ABD: Soft, nondistended and nontender with normal bowel sounds.   MSK: Muscle strength and tone were equal bilaterally. Moves all extremities easily and intentionally.   EXTREMITIES: No cyanosis, clubbing or edema.  NEURO: Alert and oriented x 3. Normal affect. Sensation to touch was normal. Face is symmetric.  PSYCH: memory and judgement impaired at baseline     Lab/Diagnostic data:  Labs reviewed in EPIC by me including sodium, potassium, creatinine, Hgb, WBC, CO2, UA    ASSESSMENT/PLAN:  Periprosthetic fracture  around internal prosthetic right hip joint (HC)  Fall  Weakness  Meningioma  Thoracic Compression Fractures T1-T4  Nonoperative per neurosurgery.  PT/OT  Pain controlled with scheduled tylenol and PRN oxycodone (wean at future visits)  CBC/BMP due 4/9/25  No bracing needed for thoracic fractures  Outpatient follow-up with neurosurgery for spinal stenosis and meningioma  Posada catheter in place- PVR trial ordered. If unsuccessful, follow up with Urology    Seizure disorder   No seixure activity reported since being in TCU  Continue PTA Keppra 500 mg TID    Primary HTN  Paroxysmal afib  Acceptable BpsHRs 120s/70s an d 80s  Continue amlodipine 2.5 mg, losartan 100 mg daily, metoprolol succinate 50 mg BID  Eliquis for prevention    Orders:  Schedule tylenol  BMP/CBC    >45 minutes spent assessing patient, providing education, reviewing records from recent hospitalization at [facility], collaborating with nursing, developing plan of care.     Electronically signed by:  MELITA Taylor CNP on 4/8/2025 at 4:37 PM

## 2025-04-08 NOTE — LETTER
4/8/2025      Ne Monzon  [address] [place] [address]        [facility] GERIATRICS    PRIMARY CARE PROVIDER AND CLINIC:  Billy Thompson MD, [facility] [address]  / [place] [id number]  Chief Complaint   Patient presents with     Hospital F/U      [place] Medical Record Number:  [id number]  Place of Service where encounter took place:  [facility] ([facility]) [[id number]]    Ne Monzon  is a 80 year old  (1945), admitted to the above facility from  [facility]. Hospital stay 4/2/25 through 4/7/25. PMHx includes paroxysmal atrial fibrillation, depressive disorder history of DVT with PE, hypertension    HPI:    Brief Hospital Summary:   Ne presented to the ED following a fall two days earlier when trying to use the bathroom. Noticed worsened hip pain. Also on Eliquis. Workup in ED shows right periprosthetic hip fracture and patient was admitted with orthopedic consultation. Head CT with hyperdense structure right anterior cranial fossa this is favorable meningioma however with trauma subdural hematoma cannot be excluded and MRI consistent with meningioma. Seen by Orthopedics and they recommended Nonoperative management of periprosthetic hip fracture. CT cervical spine also showed Age-indeterminate superior endplate compression fractures at T1, T2, T3, and T4.  Spine was consulted, MRI showed chronic T-spine fractures along with severe spinal stenosis at C4-5, C5-6, per neurosurgery no need for bracing and they recommended outpatient follow-up for spinal stenosis and meningioma. Patient also required machado for urine retention.    Today Ne is resting comfortably in bed with her  at her bedside. She does not believe she has needed to use the pain medication yet. She would like to have tylenol scheduled. No pain with the catheter. Discussed that we will be removing the cath this week. Sleeping well. Appetite stable. Mood stable. Having regular bowel  movements. Denies shortness of breath, chest pain, palpitations, dizziness, lightheadedness.     CODE STATUS/ADVANCE DIRECTIVES DISCUSSION:  No Order  CPR/Full code   ALLERGIES: No Known Allergies   PAST MEDICAL HISTORY: No past medical history on file.   PAST SURGICAL HISTORY:   has a past surgical history that includes gastric bypass (2003); Inner ear surgery; appendectomy; Hysterectomy; Lumpectomy breast (2009); Total Hip Arthroplasty (Left, 02/14/2011); Hemiarthroplasty Hip (Right, 12/03/2010); Arthroplasty revision hip (Right, 05/16/2011); Release carpal tunnel (Left, 01/29/2015); Endoscopic retrograde cholangiopancreatogram (02/27/2019); Cholecystectomy Open (08/30/2019); Endoscopic retrograde cholangiopancreatogram (08/30/2019); and IR IVC Filter Placement (Right, 03/11/2019).  FAMILY HISTORY: family history includes Breast Cancer in her maternal grandmother; Cancer in her sister; Chronic Obstructive Pulmonary Disease in her sister; Diabetes in her mother; Hypertension in her mother; Kidney Cancer in her mother; No Known Problems in her father, paternal grandfather, and paternal grandmother.  SOCIAL HISTORY:   reports that she quit smoking about 60 years ago. She has never used smokeless tobacco. She reports that she does not currently use alcohol after a past usage of about 4.0 standard drinks of alcohol per week.  Patient's living condition: lives with partner in independently living senior home    Current Outpatient Medications   Medication Sig Dispense Refill     acetaminophen (TYLENOL) 500 MG tablet Take 1,000 mg by mouth 4 times daily.       alendronate (FOSAMAX) 70 MG tablet Take 70 mg by mouth every 7 days.       amLODIPine (NORVASC) 2.5 MG tablet Take 2.5 mg by mouth daily.       apixaban (ELIQUIS) 5 mg Tab tablet [APIXABAN (ELIQUIS) 5 MG TAB TABLET] Take 5 mg by mouth 2 (two) times a day.       carboxymethylcellulose (REFRESH LIQUIGEL) 1 % ophthalmic solution Place 1 drop into both eyes 4 times  daily as needed for dry eyes.       Cyanocobalamin 1000 MCG CAPS Take 2,000 mcg by mouth daily.       levETIRAcetam (KEPPRA) 500 MG tablet [LEVETIRACETAM (KEPPRA) 500 MG TABLET] Take 500 mg by mouth 3 (three) times a day.              losartan (COZAAR) 100 MG tablet Take 100 mg by mouth daily.       magnesium chloride (SLOW-MAG) 64 mg TbEC delayed-release tablet [MAGNESIUM CHLORIDE (SLOW-MAG) 64 MG TBEC DELAYED-RELEASE TABLET] Take 64 mg by mouth 3 (three) times a day.       metoprolol succinate (TOPROL-XL) 50 MG 24 hr tablet [METOPROLOL SUCCINATE (TOPROL-XL) 50 MG 24 HR TABLET] Take 50 mg by mouth daily.              oxyCODONE (ROXICODONE) 5 MG tablet Take 5 mg by mouth every 6 hours as needed for severe pain.       potassium chloride (KLOR-CON) 10 MEQ CR tablet [POTASSIUM CHLORIDE (KLOR-CON) 10 MEQ CR TABLET] Take 10 mEq by mouth 3 (three) times a day.              venlafaxine (EFFEXOR-XR) 150 MG 24 hr capsule [VENLAFAXINE (EFFEXOR-XR) 150 MG 24 HR CAPSULE] Take 150 mg by mouth daily.       No current facility-administered medications for this visit.      ROS:  4 point ROS including Respiratory, CV, GI and , other than that noted in the HPI,  is negative    Vital signs:/74   Pulse 103   Temp (!) 96.6  F (35.9  C)   Resp 18   SpO2 92%    GENERAL APPEARANCE: Well developed, well nourished, in no acute distress.  LUNGS: Lung sounds CTA, no adventitious sounds, respiratory effort normal.  CARD: RRR, S1, S2, without murmurs, gallops, rubs, no JVD, peripheral pulses 2+ and symmetric  ABD: Soft, nondistended and nontender with normal bowel sounds.   MSK: Muscle strength and tone were equal bilaterally. Moves all extremities easily and intentionally.   EXTREMITIES: No cyanosis, clubbing or edema.  NEURO: Alert and oriented x 3. Normal affect. Sensation to touch was normal. Face is symmetric.  PSYCH: memory and judgement impaired at baseline     Lab/Diagnostic data:  Labs reviewed in EPIC by me including  sodium, potassium, creatinine, Hgb, WBC, CO2, UA    ASSESSMENT/PLAN:  Periprosthetic fracture around internal prosthetic right hip joint (HC)  Fall  Weakness  Meningioma  Thoracic Compression Fractures T1-T4  Nonoperative per neurosurgery.  PT/OT  Pain controlled with scheduled tylenol and PRN oxycodone (wean at future visits)  CBC/BMP due 4/9/25  No bracing needed for thoracic fractures  Outpatient follow-up with neurosurgery for spinal stenosis and meningioma  Posada catheter in place- PVR trial ordered. If unsuccessful, follow up with Urology    Seizure disorder   No seixure activity reported since being in TCU  Continue PTA Keppra 500 mg TID    Primary HTN  Paroxysmal afib  Acceptable BpsHRs 120s/70s an d 80s  Continue amlodipine 2.5 mg, losartan 100 mg daily, metoprolol succinate 50 mg BID  Eliquis for prevention    Orders:  Schedule tylenol  BMP/CBC    >45 minutes spent assessing patient, providing education, reviewing records from recent hospitalization at [facility], collaborating with nursing, developing plan of care.     Electronically signed by:  MELITA Taylor CNP on 4/8/2025 at 4:37 PM                   Sincerely,        MELITA Taylor CNP    Electronically signed

## 2025-04-09 LAB
ANION GAP SERPL CALCULATED.3IONS-SCNC: 12 MMOL/L (ref 7–15)
BUN SERPL-MCNC: 14.6 MG/DL (ref 8–23)
CALCIUM SERPL-MCNC: 8.8 MG/DL (ref 8.8–10.4)
CHLORIDE SERPL-SCNC: 101 MMOL/L (ref 98–107)
CREAT SERPL-MCNC: 0.73 MG/DL (ref 0.51–0.95)
EGFRCR SERPLBLD CKD-EPI 2021: 82 ML/MIN/1.73M2
ERYTHROCYTE [DISTWIDTH] IN BLOOD BY AUTOMATED COUNT: 13.3 % (ref 10–15)
GLUCOSE SERPL-MCNC: 88 MG/DL (ref 70–99)
HCO3 SERPL-SCNC: 25 MMOL/L (ref 22–29)
HCT VFR BLD AUTO: 35.7 % (ref 35–47)
HGB BLD-MCNC: 11.4 G/DL (ref 11.7–15.7)
MCH RBC QN AUTO: 31.5 PG (ref 26.5–33)
MCHC RBC AUTO-ENTMCNC: 31.9 G/DL (ref 31.5–36.5)
MCV RBC AUTO: 99 FL (ref 78–100)
PLATELET # BLD AUTO: 218 10E3/UL (ref 150–450)
POTASSIUM SERPL-SCNC: 3.8 MMOL/L (ref 3.4–5.3)
RBC # BLD AUTO: 3.62 10E6/UL (ref 3.8–5.2)
SODIUM SERPL-SCNC: 138 MMOL/L (ref 135–145)
WBC # BLD AUTO: 4.4 10E3/UL (ref 4–11)

## 2025-04-09 PROCEDURE — 80048 BASIC METABOLIC PNL TOTAL CA: CPT | Mod: ORL | Performed by: FAMILY MEDICINE

## 2025-04-09 PROCEDURE — 85027 COMPLETE CBC AUTOMATED: CPT | Mod: ORL | Performed by: FAMILY MEDICINE

## 2025-04-09 PROCEDURE — P9604 ONE-WAY ALLOW PRORATED TRIP: HCPCS | Mod: ORL | Performed by: FAMILY MEDICINE

## 2025-04-09 PROCEDURE — 36415 COLL VENOUS BLD VENIPUNCTURE: CPT | Mod: ORL | Performed by: FAMILY MEDICINE

## 2025-04-16 ENCOUNTER — TRANSITIONAL CARE UNIT VISIT (OUTPATIENT)
Dept: GERIATRICS | Facility: CLINIC | Age: 80
End: 2025-04-16
Payer: COMMERCIAL

## 2025-04-16 ENCOUNTER — LAB REQUISITION (OUTPATIENT)
Dept: LAB | Facility: CLINIC | Age: 80
End: 2025-04-16
Payer: COMMERCIAL

## 2025-04-16 VITALS
BODY MASS INDEX: 21.09 KG/M2 | RESPIRATION RATE: 16 BRPM | SYSTOLIC BLOOD PRESSURE: 142 MMHG | WEIGHT: 126.6 LBS | HEART RATE: 78 BPM | TEMPERATURE: 97.7 F | DIASTOLIC BLOOD PRESSURE: 83 MMHG | OXYGEN SATURATION: 92 % | HEIGHT: 65 IN

## 2025-04-16 DIAGNOSIS — I48.0 PAROXYSMAL ATRIAL FIBRILLATION (H): ICD-10-CM

## 2025-04-16 DIAGNOSIS — M62.81 GENERALIZED MUSCLE WEAKNESS: ICD-10-CM

## 2025-04-16 DIAGNOSIS — G40.909 SEIZURE DISORDER (H): ICD-10-CM

## 2025-04-16 DIAGNOSIS — I10 PRIMARY HYPERTENSION: ICD-10-CM

## 2025-04-16 DIAGNOSIS — M97.01XD PERIPROSTHETIC FRACTURE AROUND INTERNAL PROSTHETIC RIGHT HIP JOINT, SUBSEQUENT ENCOUNTER: Primary | ICD-10-CM

## 2025-04-16 DIAGNOSIS — R30.0 DYSURIA: ICD-10-CM
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
## 2025-04-16 DIAGNOSIS — D32.9 MENINGIOMA (H): ICD-10-CM

## 2025-04-16 DIAGNOSIS — R52 PAIN, UNSPECIFIED: ICD-10-CM

## 2025-04-16 LAB
ALBUMIN UR-MCNC: 30 MG/DL
APPEARANCE UR: ABNORMAL
BILIRUB UR QL STRIP: NEGATIVE
COLOR UR AUTO: YELLOW
GLUCOSE UR STRIP-MCNC: NEGATIVE MG/DL
HGB UR QL STRIP: NEGATIVE
KETONES UR STRIP-MCNC: NEGATIVE MG/DL
LEUKOCYTE ESTERASE UR QL STRIP: ABNORMAL
MUCOUS THREADS #/AREA URNS LPF: PRESENT /LPF
NITRATE UR QL: NEGATIVE
PH UR STRIP: 7 [PH] (ref 5–7)
RBC URINE: 3 /HPF
SP GR UR STRIP: 1.02 (ref 1–1.03)
UROBILINOGEN UR STRIP-MCNC: 3 MG/DL
WBC CLUMPS #/AREA URNS HPF: PRESENT /HPF
WBC URINE: >182 /HPF

## 2025-04-16 PROCEDURE — 99309 SBSQ NF CARE MODERATE MDM 30: CPT

## 2025-04-16 NOTE — LETTER
4/16/2025      Ne Monzon  [address]  [place] [address]        [facility] GERIATRICS    PRIMARY CARE PROVIDER AND CLINIC:  Billy Thompson MD, [facility] [address]  / [place] [address]  Chief Complaint   Patient presents with     RECHECK      [place] Medical Record Number:  [id number]  Place of Service where encounter took place:  [facility] ([facility]) [[id number]]    Ne Monzon  is a 80 year old  (1945), admitted to the above facility from  [facility]. Hospital stay 4/2/25 through 4/7/25. PMHx includes paroxysmal atrial fibrillation, depressive disorder history of DVT with PE, hypertension    HPI:    Brief Hospital Summary:   Ne presented to the ED following a fall two days earlier when trying to use the bathroom. Noticed worsened hip pain. Also on Eliquis. Workup in ED shows right periprosthetic hip fracture and patient was admitted with orthopedic consultation. Head CT with hyperdense structure right anterior cranial fossa this is favorable meningioma however with trauma subdural hematoma cannot be excluded and MRI consistent with meningioma. Seen by Orthopedics and they recommended Nonoperative management of periprosthetic hip fracture. CT cervical spine also showed Age-indeterminate superior endplate compression fractures at T1, T2, T3, and T4.  Spine was consulted, MRI showed chronic T-spine fractures along with severe spinal stenosis at C4-5, C5-6, per neurosurgery no need for bracing and they recommended outpatient follow-up for spinal stenosis and meningioma. Patient also required machado for urine retention.    Today Ne is up eating breakfast when I arrive. Reports her pain is managed with tylenol. Not using oxycodone. She has been successfully weaned from supplemental oxygen. Nursing is attempting to get a urine sample as she had complained of burning with urination. At baseline she has confusion. Yesterday she was found in another residents  "bed, thinking it was hers. Per family this is baseline confusion for her. Sleeping well. Appetite stable. Mood stable. Having daily bowel movements. Denies shortness of breath, chest pain, palpitations, dizziness, lightheadedness.    CODE STATUS/ADVANCE DIRECTIVES DISCUSSION:  Full Code  CPR/Full code   ALLERGIES: No Known Allergies     Current Outpatient Medications   Medication Sig Dispense Refill     acetaminophen (TYLENOL) 500 MG tablet Take 1,000 mg by mouth 4 times daily.       alendronate (FOSAMAX) 70 MG tablet Take 70 mg by mouth every 7 days.       amLODIPine (NORVASC) 2.5 MG tablet Take 2.5 mg by mouth daily.       apixaban (ELIQUIS) 5 mg Tab tablet [APIXABAN (ELIQUIS) 5 MG TAB TABLET] Take 5 mg by mouth 2 (two) times a day.       carboxymethylcellulose (REFRESH LIQUIGEL) 1 % ophthalmic solution Place 1 drop into both eyes 4 times daily as needed for dry eyes.       Cyanocobalamin 1000 MCG CAPS Take 2,000 mcg by mouth daily.       levETIRAcetam (KEPPRA) 500 MG tablet [LEVETIRACETAM (KEPPRA) 500 MG TABLET] Take 500 mg by mouth 3 (three) times a day.              losartan (COZAAR) 100 MG tablet Take 100 mg by mouth daily.       magnesium chloride (SLOW-MAG) 64 mg TbEC delayed-release tablet [MAGNESIUM CHLORIDE (SLOW-MAG) 64 MG TBEC DELAYED-RELEASE TABLET] Take 64 mg by mouth 3 (three) times a day.       metoprolol succinate (TOPROL-XL) 50 MG 24 hr tablet Take 50 mg by mouth 2 times daily.       potassium chloride (KLOR-CON) 10 MEQ CR tablet [POTASSIUM CHLORIDE (KLOR-CON) 10 MEQ CR TABLET] Take 10 mEq by mouth 3 (three) times a day.              venlafaxine (EFFEXOR-XR) 150 MG 24 hr capsule [VENLAFAXINE (EFFEXOR-XR) 150 MG 24 HR CAPSULE] Take 150 mg by mouth daily.       No current facility-administered medications for this visit.      ROS:  Other than stated in HPI all other review of systems is negative.      Vital signs:BP (!) 142/83   Pulse 78   Temp 97.7  F (36.5  C)   Resp 16   Ht 1.651 m (5' 5\")   " Wt 57.4 kg (126 lb 9.6 oz)   SpO2 92%   BMI 21.07 kg/m     GENERAL APPEARANCE: Well developed, well nourished, in no acute distress.  LUNGS: Lung sounds CTA, no adventitious sounds, respiratory effort normal.  CARD: irregularly irregular rhythm per afib. Regular rate  ABD: Soft, nondistended and nontender with normal bowel sounds.   MSK: Muscle strength and tone were equal bilaterally. Moves all extremities easily and intentionally.   EXTREMITIES: No cyanosis, clubbing or edema.  NEURO: Alert and oriented x 3. Normal affect. Sensation to touch was normal. Face is symmetric.  PSYCH: memory and judgment impaired at baseline     Lab/Diagnostic data:  Labs reviewed in EPIC by me.  Pending UA/UC    ASSESSMENT/PLAN:  Periprosthetic fracture around internal prosthetic right hip joint (HC)  Fall  Weakness  Meningioma  Thoracic Compression Fractures T1-T4  Nonoperative per neurosurgery.  PT/OT  Pain controlled with scheduled tylenol   Discontinue oxycodone  No bracing needed for thoracic fractures  Outpatient follow-up with neurosurgery for spinal stenosis and meningioma    Seizure disorder   Chronic, stable  No seizure activity reported since being in TCU  Continue PTA Keppra 500 mg TID  No changes today    Primary HTN  Paroxysmal afib  Acceptable Bps 120-140/80s; HR 70-80s  Continue amlodipine 2.5 mg, losartan 100 mg daily, metoprolol succinate 50 mg BID  Eliquis for stroke prevention    Dysuria  UA/UC pending   Denies flank pain, suprapubic pain    Orders:  Discontinue oxycodone    Electronically signed by:  MELITA Taylor CNP on 4/16/2025 at 2:43 PM                     Sincerely,        MELITA Taylor CNP    Electronically signed

## 2025-04-16 NOTE — PROGRESS NOTES
[facility] GERIATRICS    PRIMARY CARE PROVIDER AND CLINIC:  Billy Thompson MD, [facility] [address]  / [place] [address]  Chief Complaint   Patient presents with    PRADEEPECK      [place] Medical Record Number:  [id number]  Place of Service where encounter took place:  [facility] ([facility]) [[id number]]    Ne Monzon  is a 80 year old  (1945), admitted to the above facility from  [facility]. Hospital stay 4/2/25 through 4/7/25. PMHx includes paroxysmal atrial fibrillation, depressive disorder history of DVT with PE, hypertension    HPI:    Brief Hospital Summary:   Ne presented to the ED following a fall two days earlier when trying to use the bathroom. Noticed worsened hip pain. Also on Eliquis. Workup in ED shows right periprosthetic hip fracture and patient was admitted with orthopedic consultation. Head CT with hyperdense structure right anterior cranial fossa this is favorable meningioma however with trauma subdural hematoma cannot be excluded and MRI consistent with meningioma. Seen by Orthopedics and they recommended Nonoperative management of periprosthetic hip fracture. CT cervical spine also showed Age-indeterminate superior endplate compression fractures at T1, T2, T3, and T4.  Spine was consulted, MRI showed chronic T-spine fractures along with severe spinal stenosis at C4-5, C5-6, per neurosurgery no need for bracing and they recommended outpatient follow-up for spinal stenosis and meningioma. Patient also required machado for urine retention.    Today Ne is up eating breakfast when I arrive. Reports her pain is managed with tylenol. Not using oxycodone. She has been successfully weaned from supplemental oxygen. Nursing is attempting to get a urine sample as she had complained of burning with urination. At baseline she has confusion. Yesterday she was found in another residents bed, thinking it was hers. Per family this is baseline confusion for her.  "Sleeping well. Appetite stable. Mood stable. Having daily bowel movements. Denies shortness of breath, chest pain, palpitations, dizziness, lightheadedness.    CODE STATUS/ADVANCE DIRECTIVES DISCUSSION:  Full Code  CPR/Full code   ALLERGIES: No Known Allergies     Current Outpatient Medications   Medication Sig Dispense Refill    acetaminophen (TYLENOL) 500 MG tablet Take 1,000 mg by mouth 4 times daily.      alendronate (FOSAMAX) 70 MG tablet Take 70 mg by mouth every 7 days.      amLODIPine (NORVASC) 2.5 MG tablet Take 2.5 mg by mouth daily.      apixaban (ELIQUIS) 5 mg Tab tablet [APIXABAN (ELIQUIS) 5 MG TAB TABLET] Take 5 mg by mouth 2 (two) times a day.      carboxymethylcellulose (REFRESH LIQUIGEL) 1 % ophthalmic solution Place 1 drop into both eyes 4 times daily as needed for dry eyes.      Cyanocobalamin 1000 MCG CAPS Take 2,000 mcg by mouth daily.      levETIRAcetam (KEPPRA) 500 MG tablet [LEVETIRACETAM (KEPPRA) 500 MG TABLET] Take 500 mg by mouth 3 (three) times a day.             losartan (COZAAR) 100 MG tablet Take 100 mg by mouth daily.      magnesium chloride (SLOW-MAG) 64 mg TbEC delayed-release tablet [MAGNESIUM CHLORIDE (SLOW-MAG) 64 MG TBEC DELAYED-RELEASE TABLET] Take 64 mg by mouth 3 (three) times a day.      metoprolol succinate (TOPROL-XL) 50 MG 24 hr tablet Take 50 mg by mouth 2 times daily.      potassium chloride (KLOR-CON) 10 MEQ CR tablet [POTASSIUM CHLORIDE (KLOR-CON) 10 MEQ CR TABLET] Take 10 mEq by mouth 3 (three) times a day.             venlafaxine (EFFEXOR-XR) 150 MG 24 hr capsule [VENLAFAXINE (EFFEXOR-XR) 150 MG 24 HR CAPSULE] Take 150 mg by mouth daily.       No current facility-administered medications for this visit.      ROS:  Other than stated in HPI all other review of systems is negative.      Vital signs:BP (!) 142/83   Pulse 78   Temp 97.7  F (36.5  C)   Resp 16   Ht 1.651 m (5' 5\")   Wt 57.4 kg (126 lb 9.6 oz)   SpO2 92%   BMI 21.07 kg/m     GENERAL APPEARANCE: Well " developed, well nourished, in no acute distress.  LUNGS: Lung sounds CTA, no adventitious sounds, respiratory effort normal.  CARD: irregularly irregular rhythm per afib. Regular rate  ABD: Soft, nondistended and nontender with normal bowel sounds.   MSK: Muscle strength and tone were equal bilaterally. Moves all extremities easily and intentionally.   EXTREMITIES: No cyanosis, clubbing or edema.  NEURO: Alert and oriented x 3. Normal affect. Sensation to touch was normal. Face is symmetric.  PSYCH: memory and judgment impaired at baseline     Lab/Diagnostic data:  Labs reviewed in EPIC by me.  Pending UA/UC    ASSESSMENT/PLAN:  Periprosthetic fracture around internal prosthetic right hip joint (HC)  Fall  Weakness  Meningioma  Thoracic Compression Fractures T1-T4  Nonoperative per neurosurgery.  PT/OT  Pain controlled with scheduled tylenol   Discontinue oxycodone  No bracing needed for thoracic fractures  Outpatient follow-up with neurosurgery for spinal stenosis and meningioma    Seizure disorder   Chronic, stable  No seizure activity reported since being in TCU  Continue PTA Keppra 500 mg TID  No changes today    Primary HTN  Paroxysmal afib  Acceptable Bps 120-140/80s; HR 70-80s  Continue amlodipine 2.5 mg, losartan 100 mg daily, metoprolol succinate 50 mg BID  Eliquis for stroke prevention    Dysuria  UA/UC pending   Denies flank pain, suprapubic pain    Orders:  Discontinue oxycodone    Electronically signed by:  MELITA Taylor CNP on 4/16/2025 at 2:43 PM

## 2025-04-17 LAB — BACTERIA UR CULT: NORMAL

## 2025-04-21 ENCOUNTER — TRANSITIONAL CARE UNIT VISIT (OUTPATIENT)
Dept: GERIATRICS | Facility: CLINIC | Age: 80
End: 2025-04-21
Payer: COMMERCIAL

## 2025-04-21 VITALS
DIASTOLIC BLOOD PRESSURE: 76 MMHG | HEART RATE: 78 BPM | WEIGHT: 126.6 LBS | SYSTOLIC BLOOD PRESSURE: 126 MMHG | BODY MASS INDEX: 21.09 KG/M2 | RESPIRATION RATE: 17 BRPM | TEMPERATURE: 97.7 F | HEIGHT: 65 IN | OXYGEN SATURATION: 93 %

## 2025-04-21 DIAGNOSIS — M62.81 GENERALIZED MUSCLE WEAKNESS: ICD-10-CM
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
## 2025-04-21 DIAGNOSIS — D32.9 MENINGIOMA (H): ICD-10-CM

## 2025-04-21 DIAGNOSIS — I48.0 PAROXYSMAL ATRIAL FIBRILLATION (H): ICD-10-CM

## 2025-04-21 DIAGNOSIS — M97.01XD PERIPROSTHETIC FRACTURE AROUND INTERNAL PROSTHETIC RIGHT HIP JOINT, SUBSEQUENT ENCOUNTER: Primary | ICD-10-CM

## 2025-04-21 DIAGNOSIS — I10 PRIMARY HYPERTENSION: ICD-10-CM

## 2025-04-21 PROCEDURE — 99309 SBSQ NF CARE MODERATE MDM 30: CPT

## 2025-04-21 NOTE — PROGRESS NOTES
[facility] GERIATRICS    PRIMARY CARE PROVIDER AND CLINIC:  Billy Thompson MD, [facility] [address]  / [place] [address]  Chief Complaint   Patient presents with    PRADEEPECK      [place] Medical Record Number:  [id number]  Place of Service where encounter took place:  [facility] ([facility]) [[id number]]    Ne Monzon  is a 80 year old  (1945), admitted to the above facility from  [facility]. Hospital stay 4/2/25 through 4/7/25. PMHx includes paroxysmal atrial fibrillation, depressive disorder history of DVT with PE, hypertension    HPI:    Brief Hospital Summary:   Ne presented to the ED following a fall two days earlier when trying to use the bathroom. Noticed worsened hip pain. Also on Eliquis. Workup in ED shows right periprosthetic hip fracture and patient was admitted with orthopedic consultation. Head CT with hyperdense structure right anterior cranial fossa this is favorable meningioma however with trauma subdural hematoma cannot be excluded and MRI consistent with meningioma. Seen by Orthopedics and they recommended Nonoperative management of periprosthetic hip fracture. CT cervical spine also showed Age-indeterminate superior endplate compression fractures at T1, T2, T3, and T4.  Spine was consulted, MRI showed chronic T-spine fractures along with severe spinal stenosis at C4-5, C5-6, per neurosurgery no need for bracing and they recommended outpatient follow-up for spinal stenosis and meningioma. Patient also required machado for urine retention.    Today Ne does not have any concerns. She denies the intermittent abdominal pain today. Nursing reports she is baseline confused. Reports she sometimes feels weak when walking with therapy, and at those times does not go as far. Sleeping well. Appetite stable. Mood stable. Having daily bowel movements. Denies urinary symptoms including dysuria or hematuria. Denies shortness of breath, chest pain, palpitations,  "dizziness, lightheadedness.      CODE STATUS/ADVANCE DIRECTIVES DISCUSSION:  Full Code  CPR/Full code   ALLERGIES: No Known Allergies     Current Outpatient Medications   Medication Sig Dispense Refill    acetaminophen (TYLENOL) 500 MG tablet Take 1,000 mg by mouth 4 times daily.      alendronate (FOSAMAX) 70 MG tablet Take 70 mg by mouth every 7 days.      amLODIPine (NORVASC) 2.5 MG tablet Take 2.5 mg by mouth daily.      apixaban (ELIQUIS) 5 mg Tab tablet [APIXABAN (ELIQUIS) 5 MG TAB TABLET] Take 5 mg by mouth 2 (two) times a day.      carboxymethylcellulose (REFRESH LIQUIGEL) 1 % ophthalmic solution Place 1 drop into both eyes 4 times daily as needed for dry eyes.      Cyanocobalamin 1000 MCG CAPS Take 2,000 mcg by mouth daily.      levETIRAcetam (KEPPRA) 500 MG tablet [LEVETIRACETAM (KEPPRA) 500 MG TABLET] Take 500 mg by mouth 3 (three) times a day.             losartan (COZAAR) 100 MG tablet Take 100 mg by mouth daily.      magnesium chloride (SLOW-MAG) 64 mg TbEC delayed-release tablet [MAGNESIUM CHLORIDE (SLOW-MAG) 64 MG TBEC DELAYED-RELEASE TABLET] Take 64 mg by mouth 3 (three) times a day.      metoprolol succinate (TOPROL-XL) 50 MG 24 hr tablet Take 50 mg by mouth 2 times daily.      potassium chloride (KLOR-CON) 10 MEQ CR tablet [POTASSIUM CHLORIDE (KLOR-CON) 10 MEQ CR TABLET] Take 10 mEq by mouth 3 (three) times a day.             venlafaxine (EFFEXOR-XR) 150 MG 24 hr capsule [VENLAFAXINE (EFFEXOR-XR) 150 MG 24 HR CAPSULE] Take 150 mg by mouth daily.       No current facility-administered medications for this visit.      ROS:  Other than stated in HPI all other review of systems is negative.      Vital signs:/76   Pulse 78   Temp 97.7  F (36.5  C)   Resp 17   Ht 1.651 m (5' 5\")   Wt 57.4 kg (126 lb 9.6 oz)   SpO2 93%   BMI 21.07 kg/m     GENERAL APPEARANCE: Well developed, well nourished, in no acute distress.  LUNGS: Lung sounds CTA, no adventitious sounds, respiratory effort " normal.  CARD: irregularly irregular rhythm per afib, regular rate  ABD: Soft, nondistended and nontender with normal bowel sounds.   MSK: Muscle strength and tone were equal bilaterally. Moves all extremities easily and intentionally.   EXTREMITIES: No cyanosis, clubbing or edema.  NEURO: Alert and oriented x 3.   PSYCH: confused at baseline     Lab/Diagnostic data:  Labs reviewed in EPIC by me.      ASSESSMENT/PLAN:  Periprosthetic fracture around internal prosthetic right hip joint (HC)  Fall  Weakness  Meningioma  Thoracic Compression Fractures T1-T4  Nonoperative per neurosurgery.  PT/OT  Pain controlled with scheduled tylenol   No bracing needed for thoracic fractures  Outpatient follow-up with neurosurgery for spinal stenosis and meningioma  No changes today    Primary HTN  Paroxysmal afib  Chronic, stable  Acceptable Bps 120-140/80s; HR 70-80s  Continue amlodipine 2.5 mg, losartan 100 mg daily, metoprolol succinate 50 mg BID  Eliquis for stroke prevention  No changes today    Orders:  NNO    Electronically signed by:  MELITA Taylor CNP on 4/21/2025 at 3:12 PM

## 2025-04-21 NOTE — LETTER
4/21/2025      Ne Monzon  [address] [place] [address]        [facility] GERIATRICS    PRIMARY CARE PROVIDER AND CLINIC:  Billy Thompson MD, [facility] [address]  / [place] [id number]  Chief Complaint   Patient presents with     RECHECK      [place] Medical Record Number:  [id number]  Place of Service where encounter took place:  [facility] ([facility]) [[id number]]    Ne Monzon  is a 80 year old  (1945), admitted to the above facility from  [facility]. Hospital stay 4/2/25 through 4/7/25. PMHx includes paroxysmal atrial fibrillation, depressive disorder history of DVT with PE, hypertension    HPI:    Brief Hospital Summary:   Ne presented to the ED following a fall two days earlier when trying to use the bathroom. Noticed worsened hip pain. Also on Eliquis. Workup in ED shows right periprosthetic hip fracture and patient was admitted with orthopedic consultation. Head CT with hyperdense structure right anterior cranial fossa this is favorable meningioma however with trauma subdural hematoma cannot be excluded and MRI consistent with meningioma. Seen by Orthopedics and they recommended Nonoperative management of periprosthetic hip fracture. CT cervical spine also showed Age-indeterminate superior endplate compression fractures at T1, T2, T3, and T4.  Spine was consulted, MRI showed chronic T-spine fractures along with severe spinal stenosis at C4-5, C5-6, per neurosurgery no need for bracing and they recommended outpatient follow-up for spinal stenosis and meningioma. Patient also required machado for urine retention.    Today Ne does not have any concerns. She denies the intermittent abdominal pain today. Nursing reports she is baseline confused. Reports she sometimes feels weak when walking with therapy, and at those times does not go as far. Sleeping well. Appetite stable. Mood stable. Having daily bowel movements. Denies urinary symptoms including  "dysuria or hematuria. Denies shortness of breath, chest pain, palpitations, dizziness, lightheadedness.      CODE STATUS/ADVANCE DIRECTIVES DISCUSSION:  Full Code  CPR/Full code   ALLERGIES: No Known Allergies     Current Outpatient Medications   Medication Sig Dispense Refill     acetaminophen (TYLENOL) 500 MG tablet Take 1,000 mg by mouth 4 times daily.       alendronate (FOSAMAX) 70 MG tablet Take 70 mg by mouth every 7 days.       amLODIPine (NORVASC) 2.5 MG tablet Take 2.5 mg by mouth daily.       apixaban (ELIQUIS) 5 mg Tab tablet [APIXABAN (ELIQUIS) 5 MG TAB TABLET] Take 5 mg by mouth 2 (two) times a day.       carboxymethylcellulose (REFRESH LIQUIGEL) 1 % ophthalmic solution Place 1 drop into both eyes 4 times daily as needed for dry eyes.       Cyanocobalamin 1000 MCG CAPS Take 2,000 mcg by mouth daily.       levETIRAcetam (KEPPRA) 500 MG tablet [LEVETIRACETAM (KEPPRA) 500 MG TABLET] Take 500 mg by mouth 3 (three) times a day.              losartan (COZAAR) 100 MG tablet Take 100 mg by mouth daily.       magnesium chloride (SLOW-MAG) 64 mg TbEC delayed-release tablet [MAGNESIUM CHLORIDE (SLOW-MAG) 64 MG TBEC DELAYED-RELEASE TABLET] Take 64 mg by mouth 3 (three) times a day.       metoprolol succinate (TOPROL-XL) 50 MG 24 hr tablet Take 50 mg by mouth 2 times daily.       potassium chloride (KLOR-CON) 10 MEQ CR tablet [POTASSIUM CHLORIDE (KLOR-CON) 10 MEQ CR TABLET] Take 10 mEq by mouth 3 (three) times a day.              venlafaxine (EFFEXOR-XR) 150 MG 24 hr capsule [VENLAFAXINE (EFFEXOR-XR) 150 MG 24 HR CAPSULE] Take 150 mg by mouth daily.       No current facility-administered medications for this visit.      ROS:  Other than stated in HPI all other review of systems is negative.      Vital signs:/76   Pulse 78   Temp 97.7  F (36.5  C)   Resp 17   Ht 1.651 m (5' 5\")   Wt 57.4 kg (126 lb 9.6 oz)   SpO2 93%   BMI 21.07 kg/m     GENERAL APPEARANCE: Well developed, well nourished, in no acute " distress.  LUNGS: Lung sounds CTA, no adventitious sounds, respiratory effort normal.  CARD: irregularly irregular rhythm per afib, regular rate  ABD: Soft, nondistended and nontender with normal bowel sounds.   MSK: Muscle strength and tone were equal bilaterally. Moves all extremities easily and intentionally.   EXTREMITIES: No cyanosis, clubbing or edema.  NEURO: Alert and oriented x 3.   PSYCH: confused at baseline     Lab/Diagnostic data:  Labs reviewed in EPIC by me.      ASSESSMENT/PLAN:  Periprosthetic fracture around internal prosthetic right hip joint (HC)  Fall  Weakness  Meningioma  Thoracic Compression Fractures T1-T4  Nonoperative per neurosurgery.  PT/OT  Pain controlled with scheduled tylenol   No bracing needed for thoracic fractures  Outpatient follow-up with neurosurgery for spinal stenosis and meningioma  No changes today    Primary HTN  Paroxysmal afib  Chronic, stable  Acceptable Bps 120-140/80s; HR 70-80s  Continue amlodipine 2.5 mg, losartan 100 mg daily, metoprolol succinate 50 mg BID  Eliquis for stroke prevention  No changes today    Orders:  NNO    Electronically signed by:  MELITA Taylor CNP on 4/21/2025 at 3:12 PM                           Sincerely,        MELITA Talyor CNP    Electronically signed

## 2025-04-23 ENCOUNTER — DISCHARGE SUMMARY NURSING HOME (OUTPATIENT)
Dept: GERIATRICS | Facility: CLINIC | Age: 80
End: 2025-04-23
Payer: COMMERCIAL

## 2025-04-23 ENCOUNTER — LAB REQUISITION (OUTPATIENT)
Dept: LAB | Facility: CLINIC | Age: 80
End: 2025-04-23
Payer: COMMERCIAL

## 2025-04-23 VITALS
RESPIRATION RATE: 16 BRPM | SYSTOLIC BLOOD PRESSURE: 117 MMHG | WEIGHT: 129.8 LBS | BODY MASS INDEX: 21.6 KG/M2 | HEART RATE: 92 BPM | DIASTOLIC BLOOD PRESSURE: 82 MMHG | OXYGEN SATURATION: 92 % | TEMPERATURE: 96.9 F

## 2025-04-23 DIAGNOSIS — M97.01XD PERIPROSTHETIC FRACTURE AROUND INTERNAL PROSTHETIC RIGHT HIP JOINT, SUBSEQUENT ENCOUNTER: Primary | ICD-10-CM

## 2025-04-23 DIAGNOSIS — M62.81 GENERALIZED MUSCLE WEAKNESS: ICD-10-CM

## 2025-04-23 DIAGNOSIS — I10 ESSENTIAL (PRIMARY) HYPERTENSION: ICD-10-CM

## 2025-04-23 DIAGNOSIS — I48.0 PAROXYSMAL ATRIAL FIBRILLATION (H): ICD-10-CM

## 2025-04-23 DIAGNOSIS — I10 PRIMARY HYPERTENSION: ICD-10-CM

## 2025-04-23 DIAGNOSIS — D32.9 MENINGIOMA (H): ICD-10-CM
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
## 2025-04-23 DIAGNOSIS — R00.0 TACHYCARDIA: ICD-10-CM

## 2025-04-23 PROCEDURE — 99316 NF DSCHRG MGMT 30 MIN+: CPT

## 2025-04-23 NOTE — PROGRESS NOTES
[facility] GERIATRICS DISCHARGE SUMMARY  PATIENT'S NAME: Ne Monzon  YOB: 1945  MEDICAL RECORD NUMBER:  [id number]  Place of Service where encounter took place:  [facility] ([facility]) [[id number]]    PRIMARY CARE PROVIDER AND CLINIC RESPONSIBLE AFTER TRANSFER:   Billy Thompson MD, [facility] [address]  / [place] [id number]     Transferring providers: MELITA Humphrey, Tara Ugarte MD  Recent Hospitalization/ED:  [facility] stay 4/2/2025 to 4/7/2025.  Date of SNF Admission: April 07, 2025  Date of [facility] (anticipated) Discharge: 4/25/25  Discharged to: previous independent home living with spouse  Cognitive Scores: 4.5/5.6 CPT  Physical Function:  feet using FWW stand by assist  DME: Wheeled walker    CODE STATUS/ADVANCE DIRECTIVES DISCUSSION:  Full Code   ALLERGIES: Patient has no known allergies.    NURSING FACILITY COURSE   Medication Changes/Rationale:   Discontinued oxycodone due to pain well controlled    Summary of nursing facility stay:   Ne had an uneventful stay. She did have intermittent confusion which is her baseline. PT reported elevated heart rate (up to 140) with activity that takes 5-10 minutes rest to resume to baseline. All other vitals stable. Ne denies having any palpitations, chest pain or shortness of breath. Did have one bout of nausea during therapy that resolved with rest. Eating and drinking okay. Sleeping well.  Mood stable. Having daily bowel movements. Denies urinary symptoms including dysuria or hematuria. Reports pain to managed with tylenol.    ASSESSMENT/PLAN:  Periprosthetic fracture around internal prosthetic right hip joint (HC)  Fall  Weakness  Meningioma  Thoracic Compression Fractures T1-T4  Nonoperative per neurosurgery.  PT/OT  Pain controlled with scheduled tylenol   No bracing needed for thoracic fractures  Outpatient follow-up with neurosurgery for spinal stenosis and meningioma  Home with  PT/OT     Primary HTN  Paroxysmal afib  Tachycardia  Chronic, stable  Acceptable Bps 120-140/80s; HR 70-80s  Continue amlodipine 2.5 mg, losartan 100 mg daily, metoprolol succinate 50 mg BID  Eliquis for stroke prevention  Tachycardia with exercise (up to 140 bpm) - not in afib on exam. Will obtain CBC and BMP to rule out anemia/electrolyte derangements/dehydration  Recommend 6-8 glasses water daily    Discharge Medications:  Current Outpatient Medications   Medication Sig Dispense Refill    acetaminophen (TYLENOL) 500 MG tablet Take 1,000 mg by mouth 4 times daily.      alendronate (FOSAMAX) 70 MG tablet Take 70 mg by mouth every 7 days.      amLODIPine (NORVASC) 2.5 MG tablet Take 2.5 mg by mouth daily.      apixaban (ELIQUIS) 5 mg Tab tablet [APIXABAN (ELIQUIS) 5 MG TAB TABLET] Take 5 mg by mouth 2 (two) times a day.      carboxymethylcellulose (REFRESH LIQUIGEL) 1 % ophthalmic solution Place 1 drop into both eyes 4 times daily as needed for dry eyes.      Cyanocobalamin 1000 MCG CAPS Take 2,000 mcg by mouth daily.      levETIRAcetam (KEPPRA) 500 MG tablet [LEVETIRACETAM (KEPPRA) 500 MG TABLET] Take 500 mg by mouth 3 (three) times a day.             losartan (COZAAR) 100 MG tablet Take 100 mg by mouth daily.      magnesium chloride (SLOW-MAG) 64 mg TbEC delayed-release tablet [MAGNESIUM CHLORIDE (SLOW-MAG) 64 MG TBEC DELAYED-RELEASE TABLET] Take 64 mg by mouth 3 (three) times a day.      metoprolol succinate (TOPROL-XL) 50 MG 24 hr tablet Take 50 mg by mouth 2 times daily.      potassium chloride (KLOR-CON) 10 MEQ CR tablet [POTASSIUM CHLORIDE (KLOR-CON) 10 MEQ CR TABLET] Take 10 mEq by mouth 3 (three) times a day.             venlafaxine (EFFEXOR-XR) 150 MG 24 hr capsule [VENLAFAXINE (EFFEXOR-XR) 150 MG 24 HR CAPSULE] Take 150 mg by mouth daily.       No current facility-administered medications for this visit.      Controlled medications:   not applicable/none     Past Medical History: No past medical history  on file.  Physical Exam:   Vital signs:/82   Pulse 92   Temp 96.9  F (36.1  C)   Resp 16   Wt 58.9 kg (129 lb 12.8 oz)   SpO2 92%   BMI 21.60 kg/m     GENERAL APPEARANCE: Well developed, well nourished, in no acute distress.  LUNGS: Lung sounds CTA, no adventitious sounds, respiratory effort normal.  CARD: RRR, S1, S2, without murmurs, gallops, rubs, no JVD, peripheral pulses 2+ and symmetric  ABD: Soft, nondistended and nontender with normal bowel sounds.   MSK: Muscle strength and tone were equal bilaterally. Moves all extremities easily and intentionally.   EXTREMITIES: No cyanosis, clubbing or edema.  NEURO: Alert and oriented x 3. Normal affect. Sensation to touch was normal. Face is symmetric.  PSYCH: memory and judgment impaired at baseline    SNF labs: Labs done in SNF are in [place] EPIC. Please refer to them using [employer]/Care Everywhere.    DISCHARGE PLAN:  Follow up labs: no labs   Medical Follow Up:      Follow up with primary care provider in 1 weeks  Discharge Services: Home Care:  Occupational Therapy and Physical Therapy  Discharge Instructions Verbalized to Patient at Discharge:   24-hour supervision is recommended for safety.   Do not drive    TOTAL DISCHARGE TIME:   Greater than 30 minutes  Electronically signed by:  MELITA Taylor CNP on 4/24/2025 at 2:33 PM     Documentation of Face to Face and Certification for Home Health Services    I certify that patient: Ne Monzon is under my care and that I, or a nurse practitioner or physician's assistant working with me, had a face-to-face encounter that meets the physician face-to-face encounter requirements with this patient on: 4/23/25    This encounter with the patient was in whole, or in part, for the following medical condition, which is the primary reason for home health care:   Periprosthetic fracture around internal prosthetic right hip joint (HC)  Fall  Weakness  Meningioma  Primary HTN  Paroxysmal  afib  Tachycardia.    I certify that, based on my findings, the following services are medically necessary home health services: Occupational Therapy and Physical Therapy.    My clinical findings support the need for the above services because: Occupational Therapy Services are needed to assess and treat cognitive ability and address ADL safety due to impairment in ability to transfer independently safely. and Physical Therapy Services are needed to assess and treat the following functional impairments: ability to transfer independently safely.    Further, I certify that my clinical findings support that this patient is homebound (i.e. absences from home require considerable and taxing effort and are for medical reasons or [employer] services or infrequently or of short duration when for other reasons) because: Requires assistance of another person or specialized equipment to access medical services because patient: Is unable to walk greater than 200 feet without rest...    Based on the above findings. I certify that this patient is confined to the home and needs intermittent skilled nursing care, physical therapy and/or speech therapy.  The patient is under my care, and I have initiated the establishment of the plan of care.  This patient will be followed by a physician who will periodically review the plan of care.  Physician/Provider to provide follow up care: Billy Thompson    Attending [facility] physician (the Medicare certified PECOS provider): Cathryn Chun MD  Physician Signature: See electronic signature associated with these discharge orders.  Date: 4/24/2025

## 2025-04-23 NOTE — LETTER
4/23/2025      Ne Monzon  [address]  [place] [address]        [facility] GERIATRICS DISCHARGE SUMMARY  PATIENT'S NAME: Ne Monzon  YOB: 1945  MEDICAL RECORD NUMBER:  [id number]  Place of Service where encounter took place:  [facility] ([facility]) [[id number]]    PRIMARY CARE PROVIDER AND CLINIC RESPONSIBLE AFTER TRANSFER:   Billy Thompson MD, [facility] [address]  / [place] [id number]     Transferring providers: MELITA Humphrey, Tara Ugarte MD  Recent Hospitalization/ED:  [facility] stay 4/2/2025 to 4/7/2025.  Date of SNF Admission: April 07, 2025  Date of [facility] (anticipated) Discharge: 4/25/25  Discharged to: previous independent home living with spouse  Cognitive Scores: 4.5/5.6 CPT  Physical Function:  feet using FWW stand by assist  DME: Wheeled walker    CODE STATUS/ADVANCE DIRECTIVES DISCUSSION:  Full Code   ALLERGIES: Patient has no known allergies.    NURSING FACILITY COURSE   Medication Changes/Rationale:   Discontinued oxycodone due to pain well controlled    Summary of nursing facility stay:   Ne had an uneventful stay. She did have intermittent confusion which is her baseline. PT reported elevated heart rate (up to 140) with activity that takes 5-10 minutes rest to resume to baseline. All other vitals stable. Ne denies having any palpitations, chest pain or shortness of breath. Did have one bout of nausea during therapy that resolved with rest. Eating and drinking okay. Sleeping well.  Mood stable. Having daily bowel movements. Denies urinary symptoms including dysuria or hematuria. Reports pain to managed with tylenol.    ASSESSMENT/PLAN:  Periprosthetic fracture around internal prosthetic right hip joint (HC)  Fall  Weakness  Meningioma  Thoracic Compression Fractures T1-T4  Nonoperative per neurosurgery.  PT/OT  Pain controlled with scheduled tylenol   No bracing needed for thoracic fractures  Outpatient  follow-up with neurosurgery for spinal stenosis and meningioma  Home with PT/OT     Primary HTN  Paroxysmal afib  Tachycardia  Chronic, stable  Acceptable Bps 120-140/80s; HR 70-80s  Continue amlodipine 2.5 mg, losartan 100 mg daily, metoprolol succinate 50 mg BID  Eliquis for stroke prevention  Tachycardia with exercise (up to 140 bpm) - not in afib on exam. Will obtain CBC and BMP to rule out anemia/electrolyte derangements/dehydration  Recommend 6-8 glasses water daily    Discharge Medications:  Current Outpatient Medications   Medication Sig Dispense Refill     acetaminophen (TYLENOL) 500 MG tablet Take 1,000 mg by mouth 4 times daily.       alendronate (FOSAMAX) 70 MG tablet Take 70 mg by mouth every 7 days.       amLODIPine (NORVASC) 2.5 MG tablet Take 2.5 mg by mouth daily.       apixaban (ELIQUIS) 5 mg Tab tablet [APIXABAN (ELIQUIS) 5 MG TAB TABLET] Take 5 mg by mouth 2 (two) times a day.       carboxymethylcellulose (REFRESH LIQUIGEL) 1 % ophthalmic solution Place 1 drop into both eyes 4 times daily as needed for dry eyes.       Cyanocobalamin 1000 MCG CAPS Take 2,000 mcg by mouth daily.       levETIRAcetam (KEPPRA) 500 MG tablet [LEVETIRACETAM (KEPPRA) 500 MG TABLET] Take 500 mg by mouth 3 (three) times a day.              losartan (COZAAR) 100 MG tablet Take 100 mg by mouth daily.       magnesium chloride (SLOW-MAG) 64 mg TbEC delayed-release tablet [MAGNESIUM CHLORIDE (SLOW-MAG) 64 MG TBEC DELAYED-RELEASE TABLET] Take 64 mg by mouth 3 (three) times a day.       metoprolol succinate (TOPROL-XL) 50 MG 24 hr tablet Take 50 mg by mouth 2 times daily.       potassium chloride (KLOR-CON) 10 MEQ CR tablet [POTASSIUM CHLORIDE (KLOR-CON) 10 MEQ CR TABLET] Take 10 mEq by mouth 3 (three) times a day.              venlafaxine (EFFEXOR-XR) 150 MG 24 hr capsule [VENLAFAXINE (EFFEXOR-XR) 150 MG 24 HR CAPSULE] Take 150 mg by mouth daily.       No current facility-administered medications for this visit.      Controlled  medications:   not applicable/none     Past Medical History: No past medical history on file.  Physical Exam:   Vital signs:/82   Pulse 92   Temp 96.9  F (36.1  C)   Resp 16   Wt 58.9 kg (129 lb 12.8 oz)   SpO2 92%   BMI 21.60 kg/m     GENERAL APPEARANCE: Well developed, well nourished, in no acute distress.  LUNGS: Lung sounds CTA, no adventitious sounds, respiratory effort normal.  CARD: RRR, S1, S2, without murmurs, gallops, rubs, no JVD, peripheral pulses 2+ and symmetric  ABD: Soft, nondistended and nontender with normal bowel sounds.   MSK: Muscle strength and tone were equal bilaterally. Moves all extremities easily and intentionally.   EXTREMITIES: No cyanosis, clubbing or edema.  NEURO: Alert and oriented x 3. Normal affect. Sensation to touch was normal. Face is symmetric.  PSYCH: memory and judgment impaired at baseline    SNF labs: Labs done in SNF are in [facility]. Please refer to them using [employer]/Care Everywhere.    DISCHARGE PLAN:  Follow up labs: no labs   Medical Follow Up:      Follow up with primary care provider in 1 weeks  Discharge Services: Home Care:  Occupational Therapy and Physical Therapy  Discharge Instructions Verbalized to Patient at Discharge:   24-hour supervision is recommended for safety.   Do not drive    TOTAL DISCHARGE TIME:   Greater than 30 minutes  Electronically signed by:  MELITA Taylor CNP on 4/24/2025 at 2:33 PM     Documentation of Face to Face and Certification for Home Health Services    I certify that patient: Ne Monzon is under my care and that I, or a nurse practitioner or physician's assistant working with me, had a face-to-face encounter that meets the physician face-to-face encounter requirements with this patient on: 4/23/25    This encounter with the patient was in whole, or in part, for the following medical condition, which is the primary reason for home health care:   Periprosthetic fracture around internal prosthetic right  hip joint (HC)  Fall  Weakness  Meningioma  Primary HTN  Paroxysmal afib  Tachycardia.    I certify that, based on my findings, the following services are medically necessary home health services: Occupational Therapy and Physical Therapy.    My clinical findings support the need for the above services because: Occupational Therapy Services are needed to assess and treat cognitive ability and address ADL safety due to impairment in ability to transfer independently safely. and Physical Therapy Services are needed to assess and treat the following functional impairments: ability to transfer independently safely.    Further, I certify that my clinical findings support that this patient is homebound (i.e. absences from home require considerable and taxing effort and are for medical reasons or [employer] services or infrequently or of short duration when for other reasons) because: Requires assistance of another person or specialized equipment to access medical services because patient: Is unable to walk greater than 200 feet without rest...    Based on the above findings. I certify that this patient is confined to the home and needs intermittent skilled nursing care, physical therapy and/or speech therapy.  The patient is under my care, and I have initiated the establishment of the plan of care.  This patient will be followed by a physician who will periodically review the plan of care.  Physician/Provider to provide follow up care: Billy Thompson    Attending hospital physician (the Medicare certified PEC provider): Cathryn Chun MD  Physician Signature: See electronic signature associated with these discharge orders.  Date: 4/24/2025                Sincerely,        MELITA Taylor CNP    Electronically signed

## 2025-04-25 LAB
ANION GAP SERPL CALCULATED.3IONS-SCNC: 19 MMOL/L (ref 7–15)
BUN SERPL-MCNC: 10.1 MG/DL (ref 8–23)
CALCIUM SERPL-MCNC: 9 MG/DL (ref 8.8–10.4)
CHLORIDE SERPL-SCNC: 101 MMOL/L (ref 98–107)
CREAT SERPL-MCNC: 0.69 MG/DL (ref 0.51–0.95)
EGFRCR SERPLBLD CKD-EPI 2021: 86 ML/MIN/1.73M2
ERYTHROCYTE [DISTWIDTH] IN BLOOD BY AUTOMATED COUNT: 13.3 % (ref 10–15)
GLUCOSE SERPL-MCNC: 90 MG/DL (ref 70–99)
HCO3 SERPL-SCNC: 14 MMOL/L (ref 22–29)
HCT VFR BLD AUTO: 39 % (ref 35–47)
HGB BLD-MCNC: 12.3 G/DL (ref 11.7–15.7)
MCH RBC QN AUTO: 31 PG (ref 26.5–33)
MCHC RBC AUTO-ENTMCNC: 31.5 G/DL (ref 31.5–36.5)
MCV RBC AUTO: 98 FL (ref 78–100)
PLATELET # BLD AUTO: 206 10E3/UL (ref 150–450)
POTASSIUM SERPL-SCNC: 3.8 MMOL/L (ref 3.4–5.3)
RBC # BLD AUTO: 3.97 10E6/UL (ref 3.8–5.2)
SODIUM SERPL-SCNC: 134 MMOL/L (ref 135–145)
WBC # BLD AUTO: 10.2 10E3/UL (ref 4–11)

## 2025-04-25 PROCEDURE — 85027 COMPLETE CBC AUTOMATED: CPT | Mod: ORL | Performed by: FAMILY MEDICINE

## 2025-04-25 PROCEDURE — 36415 COLL VENOUS BLD VENIPUNCTURE: CPT | Mod: ORL | Performed by: FAMILY MEDICINE

## 2025-04-25 PROCEDURE — 80048 BASIC METABOLIC PNL TOTAL CA: CPT | Mod: ORL | Performed by: FAMILY MEDICINE

## 2025-04-25 PROCEDURE — P9604 ONE-WAY ALLOW PRORATED TRIP: HCPCS | Mod: ORL | Performed by: FAMILY MEDICINE
